# Patient Record
Sex: FEMALE | Race: WHITE | NOT HISPANIC OR LATINO
[De-identification: names, ages, dates, MRNs, and addresses within clinical notes are randomized per-mention and may not be internally consistent; named-entity substitution may affect disease eponyms.]

---

## 2017-09-14 VITALS
BODY MASS INDEX: 50.03 KG/M2 | RESPIRATION RATE: 15 BRPM | DIASTOLIC BLOOD PRESSURE: 75 MMHG | HEART RATE: 78 BPM | HEIGHT: 61 IN | SYSTOLIC BLOOD PRESSURE: 150 MMHG | WEIGHT: 265 LBS

## 2017-11-09 ENCOUNTER — TRANSCRIPTION ENCOUNTER (OUTPATIENT)
Age: 72
End: 2017-11-09

## 2017-11-21 ENCOUNTER — RECORD ABSTRACTING (OUTPATIENT)
Age: 72
End: 2017-11-21

## 2017-12-14 ENCOUNTER — APPOINTMENT (OUTPATIENT)
Dept: ENDOCRINOLOGY | Facility: CLINIC | Age: 72
End: 2017-12-14
Payer: MEDICARE

## 2017-12-14 VITALS
WEIGHT: 269 LBS | SYSTOLIC BLOOD PRESSURE: 142 MMHG | TEMPERATURE: 97.9 F | HEART RATE: 86 BPM | DIASTOLIC BLOOD PRESSURE: 70 MMHG | BODY MASS INDEX: 50.79 KG/M2 | OXYGEN SATURATION: 16 % | RESPIRATION RATE: 16 BRPM | HEIGHT: 61 IN

## 2017-12-14 LAB — GLUCOSE BLDC GLUCOMTR-MCNC: 95

## 2017-12-14 PROCEDURE — 99214 OFFICE O/P EST MOD 30 MIN: CPT | Mod: 25

## 2017-12-14 PROCEDURE — 82962 GLUCOSE BLOOD TEST: CPT

## 2018-01-11 ENCOUNTER — APPOINTMENT (OUTPATIENT)
Dept: INTERNAL MEDICINE | Facility: CLINIC | Age: 73
End: 2018-01-11
Payer: MEDICARE

## 2018-01-11 VITALS
RESPIRATION RATE: 16 BRPM | SYSTOLIC BLOOD PRESSURE: 140 MMHG | HEIGHT: 61 IN | HEART RATE: 79 BPM | BODY MASS INDEX: 50.22 KG/M2 | OXYGEN SATURATION: 94 % | DIASTOLIC BLOOD PRESSURE: 80 MMHG | TEMPERATURE: 97.8 F | WEIGHT: 266 LBS

## 2018-01-11 DIAGNOSIS — Z82.49 FAMILY HISTORY OF ISCHEMIC HEART DISEASE AND OTHER DISEASES OF THE CIRCULATORY SYSTEM: ICD-10-CM

## 2018-01-11 DIAGNOSIS — L57.8 OTHER SKIN CHANGES DUE TO CHRONIC EXPOSURE TO NONIONIZING RADIATION: ICD-10-CM

## 2018-01-11 DIAGNOSIS — Z83.3 FAMILY HISTORY OF DIABETES MELLITUS: ICD-10-CM

## 2018-01-11 DIAGNOSIS — Z82.61 FAMILY HISTORY OF ARTHRITIS: ICD-10-CM

## 2018-01-11 PROCEDURE — 99204 OFFICE O/P NEW MOD 45 MIN: CPT

## 2018-01-11 RX ORDER — LISINOPRIL 20 MG/1
20 TABLET ORAL
Refills: 0 | Status: DISCONTINUED | COMMUNITY
End: 2018-01-11

## 2018-01-16 ENCOUNTER — CHART COPY (OUTPATIENT)
Age: 73
End: 2018-01-16

## 2018-01-16 ENCOUNTER — TRANSCRIPTION ENCOUNTER (OUTPATIENT)
Age: 73
End: 2018-01-16

## 2018-01-17 ENCOUNTER — CLINICAL ADVICE (OUTPATIENT)
Age: 73
End: 2018-01-17

## 2018-01-18 ENCOUNTER — TRANSCRIPTION ENCOUNTER (OUTPATIENT)
Age: 73
End: 2018-01-18

## 2018-01-24 ENCOUNTER — TRANSCRIPTION ENCOUNTER (OUTPATIENT)
Age: 73
End: 2018-01-24

## 2018-01-25 ENCOUNTER — APPOINTMENT (OUTPATIENT)
Dept: DERMATOLOGY | Facility: CLINIC | Age: 73
End: 2018-01-25
Payer: MEDICARE

## 2018-01-25 VITALS
TEMPERATURE: 97.2 F | BODY MASS INDEX: 51.73 KG/M2 | DIASTOLIC BLOOD PRESSURE: 83 MMHG | HEART RATE: 76 BPM | WEIGHT: 274 LBS | OXYGEN SATURATION: 96 % | HEIGHT: 61 IN | SYSTOLIC BLOOD PRESSURE: 151 MMHG

## 2018-01-25 DIAGNOSIS — D22.9 MELANOCYTIC NEVI, UNSPECIFIED: ICD-10-CM

## 2018-01-25 DIAGNOSIS — Z86.79 PERSONAL HISTORY OF OTHER DISEASES OF THE CIRCULATORY SYSTEM: ICD-10-CM

## 2018-01-25 DIAGNOSIS — Z86.39 PERSONAL HISTORY OF OTHER ENDOCRINE, NUTRITIONAL AND METABOLIC DISEASE: ICD-10-CM

## 2018-01-25 DIAGNOSIS — Z84.0 FAMILY HISTORY OF DISEASES OF THE SKIN AND SUBCUTANEOUS TISSUE: ICD-10-CM

## 2018-01-25 DIAGNOSIS — L81.4 OTHER MELANIN HYPERPIGMENTATION: ICD-10-CM

## 2018-01-25 DIAGNOSIS — D18.01 HEMANGIOMA OF SKIN AND SUBCUTANEOUS TISSUE: ICD-10-CM

## 2018-01-25 PROCEDURE — 99203 OFFICE O/P NEW LOW 30 MIN: CPT

## 2018-02-08 ENCOUNTER — APPOINTMENT (OUTPATIENT)
Dept: OBGYN | Facility: CLINIC | Age: 73
End: 2018-02-08
Payer: MEDICARE

## 2018-02-08 VITALS
HEART RATE: 89 BPM | WEIGHT: 274 LBS | RESPIRATION RATE: 16 BRPM | BODY MASS INDEX: 51.73 KG/M2 | OXYGEN SATURATION: 98 % | SYSTOLIC BLOOD PRESSURE: 149 MMHG | DIASTOLIC BLOOD PRESSURE: 84 MMHG | TEMPERATURE: 98.7 F | HEIGHT: 61 IN

## 2018-02-08 DIAGNOSIS — N95.2 POSTMENOPAUSAL ATROPHIC VAGINITIS: ICD-10-CM

## 2018-02-08 DIAGNOSIS — Z78.0 ASYMPTOMATIC MENOPAUSAL STATE: ICD-10-CM

## 2018-02-08 PROCEDURE — 99213 OFFICE O/P EST LOW 20 MIN: CPT | Mod: 25

## 2018-02-08 PROCEDURE — G0101: CPT

## 2018-02-12 LAB — CYTOLOGY CVX/VAG DOC THIN PREP: NORMAL

## 2018-02-15 ENCOUNTER — RESULT REVIEW (OUTPATIENT)
Age: 73
End: 2018-02-15

## 2018-02-23 PROBLEM — Z13.820 OSTEOPOROSIS SCREENING: Status: RESOLVED | Noted: 2018-01-11 | Resolved: 2018-02-23

## 2018-02-26 ENCOUNTER — APPOINTMENT (OUTPATIENT)
Dept: GYNECOLOGIC ONCOLOGY | Facility: CLINIC | Age: 73
End: 2018-02-26
Payer: MEDICARE

## 2018-02-26 VITALS
SYSTOLIC BLOOD PRESSURE: 142 MMHG | WEIGHT: 274 LBS | BODY MASS INDEX: 51.73 KG/M2 | HEART RATE: 86 BPM | TEMPERATURE: 97.6 F | DIASTOLIC BLOOD PRESSURE: 74 MMHG | HEIGHT: 61 IN

## 2018-02-26 DIAGNOSIS — Z13.820 ENCOUNTER FOR SCREENING FOR OSTEOPOROSIS: ICD-10-CM

## 2018-02-26 PROCEDURE — 58100 BIOPSY OF UTERUS LINING: CPT

## 2018-02-26 PROCEDURE — 99214 OFFICE O/P EST MOD 30 MIN: CPT | Mod: 25

## 2018-02-26 PROCEDURE — 99205 OFFICE O/P NEW HI 60 MIN: CPT | Mod: 25

## 2018-02-28 ENCOUNTER — RESULT REVIEW (OUTPATIENT)
Age: 73
End: 2018-02-28

## 2018-03-01 ENCOUNTER — APPOINTMENT (OUTPATIENT)
Dept: ENDOCRINOLOGY | Facility: CLINIC | Age: 73
End: 2018-03-01
Payer: MEDICARE

## 2018-03-01 VITALS
HEIGHT: 61 IN | BODY MASS INDEX: 51.54 KG/M2 | TEMPERATURE: 98.2 F | DIASTOLIC BLOOD PRESSURE: 70 MMHG | SYSTOLIC BLOOD PRESSURE: 164 MMHG | WEIGHT: 273 LBS | OXYGEN SATURATION: 98 % | HEART RATE: 66 BPM | RESPIRATION RATE: 16 BRPM

## 2018-03-01 LAB — CORE LAB BIOPSY: NORMAL

## 2018-03-01 PROCEDURE — 99214 OFFICE O/P EST MOD 30 MIN: CPT

## 2018-04-12 ENCOUNTER — APPOINTMENT (OUTPATIENT)
Dept: INTERNAL MEDICINE | Facility: CLINIC | Age: 73
End: 2018-04-12
Payer: MEDICARE

## 2018-04-12 VITALS
HEIGHT: 61 IN | DIASTOLIC BLOOD PRESSURE: 70 MMHG | BODY MASS INDEX: 49.84 KG/M2 | HEART RATE: 77 BPM | OXYGEN SATURATION: 96 % | SYSTOLIC BLOOD PRESSURE: 142 MMHG | RESPIRATION RATE: 16 BRPM | TEMPERATURE: 97.7 F | WEIGHT: 264 LBS

## 2018-04-12 PROCEDURE — 99214 OFFICE O/P EST MOD 30 MIN: CPT

## 2018-05-09 ENCOUNTER — RESULT REVIEW (OUTPATIENT)
Age: 73
End: 2018-05-09

## 2018-05-14 ENCOUNTER — APPOINTMENT (OUTPATIENT)
Dept: GYNECOLOGIC ONCOLOGY | Facility: CLINIC | Age: 73
End: 2018-05-14
Payer: MEDICARE

## 2018-05-14 VITALS
BODY MASS INDEX: 50.03 KG/M2 | OXYGEN SATURATION: 97 % | TEMPERATURE: 97.6 F | HEART RATE: 80 BPM | SYSTOLIC BLOOD PRESSURE: 140 MMHG | DIASTOLIC BLOOD PRESSURE: 60 MMHG | HEIGHT: 61 IN | WEIGHT: 265 LBS

## 2018-05-14 PROCEDURE — 99214 OFFICE O/P EST MOD 30 MIN: CPT

## 2018-05-25 ENCOUNTER — TRANSCRIPTION ENCOUNTER (OUTPATIENT)
Age: 73
End: 2018-05-25

## 2018-05-29 ENCOUNTER — RX RENEWAL (OUTPATIENT)
Age: 73
End: 2018-05-29

## 2018-05-30 ENCOUNTER — OUTPATIENT (OUTPATIENT)
Dept: OUTPATIENT SERVICES | Facility: HOSPITAL | Age: 73
LOS: 1 days | End: 2018-05-30
Payer: MEDICARE

## 2018-05-30 VITALS
HEIGHT: 60 IN | DIASTOLIC BLOOD PRESSURE: 76 MMHG | TEMPERATURE: 98 F | RESPIRATION RATE: 16 BRPM | SYSTOLIC BLOOD PRESSURE: 171 MMHG | HEART RATE: 76 BPM | WEIGHT: 270.07 LBS | OXYGEN SATURATION: 97 %

## 2018-05-30 DIAGNOSIS — Z98.890 OTHER SPECIFIED POSTPROCEDURAL STATES: Chronic | ICD-10-CM

## 2018-05-30 DIAGNOSIS — R93.8 ABNORMAL FINDINGS ON DIAGNOSTIC IMAGING OF OTHER SPECIFIED BODY STRUCTURES: ICD-10-CM

## 2018-05-30 DIAGNOSIS — Z90.89 ACQUIRED ABSENCE OF OTHER ORGANS: Chronic | ICD-10-CM

## 2018-05-30 DIAGNOSIS — Z01.818 ENCOUNTER FOR OTHER PREPROCEDURAL EXAMINATION: ICD-10-CM

## 2018-05-30 DIAGNOSIS — H26.9 UNSPECIFIED CATARACT: Chronic | ICD-10-CM

## 2018-05-30 LAB
ALBUMIN SERPL ELPH-MCNC: 3.9 G/DL — SIGNIFICANT CHANGE UP (ref 3.5–5)
ALP SERPL-CCNC: 56 U/L — SIGNIFICANT CHANGE UP (ref 40–120)
ALT FLD-CCNC: 23 U/L DA — SIGNIFICANT CHANGE UP (ref 10–60)
ANION GAP SERPL CALC-SCNC: 6 MMOL/L — SIGNIFICANT CHANGE UP (ref 5–17)
APTT BLD: 27.8 SEC — SIGNIFICANT CHANGE UP (ref 27.5–37.4)
AST SERPL-CCNC: 17 U/L — SIGNIFICANT CHANGE UP (ref 10–40)
BILIRUB SERPL-MCNC: 0.5 MG/DL — SIGNIFICANT CHANGE UP (ref 0.2–1.2)
BUN SERPL-MCNC: 22 MG/DL — HIGH (ref 7–18)
CALCIUM SERPL-MCNC: 9.4 MG/DL — SIGNIFICANT CHANGE UP (ref 8.4–10.5)
CHLORIDE SERPL-SCNC: 108 MMOL/L — SIGNIFICANT CHANGE UP (ref 96–108)
CO2 SERPL-SCNC: 29 MMOL/L — SIGNIFICANT CHANGE UP (ref 22–31)
CREAT SERPL-MCNC: 0.75 MG/DL — SIGNIFICANT CHANGE UP (ref 0.5–1.3)
GLUCOSE SERPL-MCNC: 97 MG/DL — SIGNIFICANT CHANGE UP (ref 70–99)
HCT VFR BLD CALC: 45.1 % — HIGH (ref 34.5–45)
HGB BLD-MCNC: 14.4 G/DL — SIGNIFICANT CHANGE UP (ref 11.5–15.5)
INR BLD: 1.08 RATIO — SIGNIFICANT CHANGE UP (ref 0.88–1.16)
MCHC RBC-ENTMCNC: 29.1 PG — SIGNIFICANT CHANGE UP (ref 27–34)
MCHC RBC-ENTMCNC: 32 GM/DL — SIGNIFICANT CHANGE UP (ref 32–36)
MCV RBC AUTO: 90.8 FL — SIGNIFICANT CHANGE UP (ref 80–100)
PLATELET # BLD AUTO: 225 K/UL — SIGNIFICANT CHANGE UP (ref 150–400)
POTASSIUM SERPL-MCNC: 4.1 MMOL/L — SIGNIFICANT CHANGE UP (ref 3.5–5.3)
POTASSIUM SERPL-SCNC: 4.1 MMOL/L — SIGNIFICANT CHANGE UP (ref 3.5–5.3)
PROT SERPL-MCNC: 7.8 G/DL — SIGNIFICANT CHANGE UP (ref 6–8.3)
PROTHROM AB SERPL-ACNC: 11.8 SEC — SIGNIFICANT CHANGE UP (ref 9.8–12.7)
RBC # BLD: 4.96 M/UL — SIGNIFICANT CHANGE UP (ref 3.8–5.2)
RBC # FLD: 12.3 % — SIGNIFICANT CHANGE UP (ref 10.3–14.5)
SODIUM SERPL-SCNC: 143 MMOL/L — SIGNIFICANT CHANGE UP (ref 135–145)
TSH SERPL-MCNC: 0.27 UU/ML — LOW (ref 0.34–4.82)
WBC # BLD: 6.2 K/UL — SIGNIFICANT CHANGE UP (ref 3.8–10.5)
WBC # FLD AUTO: 6.2 K/UL — SIGNIFICANT CHANGE UP (ref 3.8–10.5)

## 2018-05-30 PROCEDURE — G0463: CPT

## 2018-05-30 PROCEDURE — 85610 PROTHROMBIN TIME: CPT

## 2018-05-30 PROCEDURE — 85730 THROMBOPLASTIN TIME PARTIAL: CPT

## 2018-05-30 PROCEDURE — 84443 ASSAY THYROID STIM HORMONE: CPT

## 2018-05-30 PROCEDURE — 80053 COMPREHEN METABOLIC PANEL: CPT

## 2018-05-30 PROCEDURE — 83036 HEMOGLOBIN GLYCOSYLATED A1C: CPT

## 2018-05-30 PROCEDURE — 85027 COMPLETE CBC AUTOMATED: CPT

## 2018-05-30 RX ORDER — SODIUM CHLORIDE 9 MG/ML
3 INJECTION INTRAMUSCULAR; INTRAVENOUS; SUBCUTANEOUS EVERY 8 HOURS
Qty: 0 | Refills: 0 | Status: DISCONTINUED | OUTPATIENT
Start: 2018-06-08 | End: 2018-06-16

## 2018-05-30 NOTE — H&P PST ADULT - PMH
Abnormal finding on diagnostic imaging of other region of abdomen or pelvis    Essential hypertension    Hyperlipidemia, unspecified hyperlipidemia type    Hypothyroidism, unspecified type    Morbid obesity    Other pulmonary embolism without acute cor pulmonale, unspecified chronicity    Postmenopausal bleeding    Seasonal allergic rhinitis, unspecified trigger Essential hypertension    Hyperlipidemia, unspecified hyperlipidemia type    Hypothyroidism, unspecified type    Morbid obesity    Other pulmonary embolism without acute cor pulmonale, unspecified chronicity    Postmenopausal bleeding    Primary osteoarthritis of both knees    Seasonal allergic rhinitis, unspecified trigger

## 2018-05-30 NOTE — H&P PST ADULT - PSH
Cataract of both eyes, unspecified cataract type  excised  H/O dilation and curettage  in 2002 and 2007  History of tonsillectomy Cataract of both eyes, unspecified cataract type  s/p excision of cataracts bilaterally  H/O dilation and curettage  in 2002 and 2007  History of tonsillectomy  childhood

## 2018-05-30 NOTE — H&P PST ADULT - ASSESSMENT
73 y/o female with PMH of chronic postmenopausal bleeding, recent medication induced PE (12/14/2017) off warfarin x 3 weeks, morbid obesity BMI >50, T2DM, hypothyroidism, hypertension, diagnosed with abnormal finding on diagnostic imaging scheduled for examination under anesthesia, dilation and curettage, hysteroscopy 6/8/2018. Patient is at moderate to high risk for planned surgery

## 2018-05-30 NOTE — H&P PST ADULT - NEGATIVE GENERAL SYMPTOMS
no weight loss/no sweating/no polyphagia/no anorexia/no polyuria/no polydipsia/no fatigue/no fever/no chills/no malaise

## 2018-05-30 NOTE — H&P PST ADULT - NSANTHOSAYNRD_GEN_A_CORE
No. LUDY screening performed.  STOP BANG Legend: 0-2 = LOW Risk; 3-4 = INTERMEDIATE Risk; 5-8 = HIGH Risk

## 2018-05-30 NOTE — H&P PST ADULT - GENITOURINARY COMMENTS
preop diagnosis abnormal finding on diagnostic test h/o recurrent postmenopausal bleeding preop diagnosis:  abnormal finding on diagnostic test

## 2018-05-30 NOTE — H&P PST ADULT - NEGATIVE GASTROINTESTINAL SYMPTOMS
no flatulence/no nausea/no abdominal pain/no diarrhea/no change in bowel habits/no vomiting/no constipation

## 2018-05-30 NOTE — H&P PST ADULT - NEGATIVE RESPIRATORY AND THORAX SYMPTOMS
no dyspnea/no wheezing/no cough/no hemoptysis/no pleuritic chest pain no wheezing/no cough/no hemoptysis/no pleuritic chest pain

## 2018-05-30 NOTE — H&P PST ADULT - GASTROINTESTINAL DETAILS
no guarding/nontender/soft/no distention/no masses palpable/bowel sounds normal/no bruit/no rebound tenderness/no rigidity/no organomegaly no rigidity/obese abdomen/no guarding/nontender/no distention/no masses palpable/no rebound tenderness/no bruit/no organomegaly/soft/bowel sounds normal

## 2018-05-30 NOTE — H&P PST ADULT - FAMILY HISTORY
Father  Still living? No  Family history of heart disease, Age at diagnosis: Age Unknown     Mother  Still living? Yes, Estimated age: Age Unknown  Family history of cardiomegaly, Age at diagnosis: Age Unknown     Aunt  Still living? No  Diabetes mellitus, type 2, Age at diagnosis: Age Unknown

## 2018-05-30 NOTE — H&P PST ADULT - HISTORY OF PRESENT ILLNESS
71 y/o female with PMH of chronic postmenopausal bleeding, recent PE (12/14/2017) on warfarin, morbid obesity, T2DM, hypothyroidism, hypertension, presents to [facility] for presurgical evaluation. She had abnormal finding on transvaginal US and is scheduled for examination under anesthesia, dilation and curettage, hysteroscopy 6/8/2018 73 y/o female with PMH of chronic postmenopausal bleeding, recent medication induced PE (12/14/2017) off warfarin x 3 weeks, morbid obesity BMI >50, T2DM, hypothyroidism, hypertension, presents to [facility] for presurgical evaluation. She had abnormal finding on transvaginal US and is scheduled for examination under anesthesia, dilation and curettage, hysteroscopy 6/8/2018

## 2018-05-30 NOTE — H&P PST ADULT - MUSCULOSKELETAL
details… No joint pain, swelling or deformity; no limitation of movement detailed exam due to habitus/decreased ROM

## 2018-05-30 NOTE — H&P PST ADULT - RS GEN PE MLT RESP DETAILS PC
clear to auscultation bilaterally/breath sounds equal/good air movement/no intercostal retractions/no chest wall tenderness/no rhonchi/no subcutaneous emphysema/no wheezes/airway patent/normal/no rales/respirations non-labored no subcutaneous emphysema/respirations non-labored/no rhonchi/no wheezes/breath sounds equal/no intercostal retractions/no rales/airway patent/no chest wall tenderness/clear to auscultation bilaterally/good air movement

## 2018-05-30 NOTE — H&P PST ADULT - VENOUS THROMBOEMBOLISM CURRENT STATUS
swollen legs/major surgery, including arthroscopic and laparoscopic (greater than 1 hr)/varicose veins

## 2018-05-31 ENCOUNTER — APPOINTMENT (OUTPATIENT)
Dept: INTERNAL MEDICINE | Facility: CLINIC | Age: 73
End: 2018-05-31
Payer: MEDICARE

## 2018-05-31 ENCOUNTER — NON-APPOINTMENT (OUTPATIENT)
Age: 73
End: 2018-05-31

## 2018-05-31 VITALS — DIASTOLIC BLOOD PRESSURE: 80 MMHG | SYSTOLIC BLOOD PRESSURE: 140 MMHG

## 2018-05-31 VITALS
TEMPERATURE: 97.6 F | RESPIRATION RATE: 16 BRPM | OXYGEN SATURATION: 99 % | HEIGHT: 61 IN | BODY MASS INDEX: 50.22 KG/M2 | WEIGHT: 266 LBS | HEART RATE: 72 BPM | DIASTOLIC BLOOD PRESSURE: 81 MMHG | SYSTOLIC BLOOD PRESSURE: 144 MMHG

## 2018-05-31 DIAGNOSIS — Z87.42 PERSONAL HISTORY OF OTHER DISEASES OF THE FEMALE GENITAL TRACT: ICD-10-CM

## 2018-05-31 DIAGNOSIS — D25.9 LEIOMYOMA OF UTERUS, UNSPECIFIED: ICD-10-CM

## 2018-05-31 DIAGNOSIS — R93.8 ABNORMAL FINDINGS ON DIAGNOSTIC IMAGING OF OTHER SPECIFIED BODY STRUCTURES: ICD-10-CM

## 2018-05-31 LAB — HBA1C BLD-MCNC: 5.8 % — HIGH (ref 4–5.6)

## 2018-05-31 PROCEDURE — 93000 ELECTROCARDIOGRAM COMPLETE: CPT

## 2018-05-31 PROCEDURE — 99214 OFFICE O/P EST MOD 30 MIN: CPT | Mod: 25

## 2018-05-31 RX ORDER — WARFARIN 7.5 MG/1
7.5 TABLET ORAL DAILY
Qty: 90 | Refills: 2 | Status: DISCONTINUED | COMMUNITY
End: 2018-05-31

## 2018-05-31 RX ORDER — WARFARIN 6 MG/1
6 TABLET ORAL DAILY
Qty: 90 | Refills: 1 | Status: DISCONTINUED | COMMUNITY
End: 2018-05-31

## 2018-05-31 RX ORDER — WARFARIN SODIUM 6 MG/1
6 TABLET ORAL
Refills: 0 | Status: DISCONTINUED | COMMUNITY
End: 2018-05-31

## 2018-06-01 PROBLEM — D25.9 UTERINE LEIOMYOMA: Status: ACTIVE | Noted: 2017-11-21

## 2018-06-01 PROBLEM — Z87.42 HISTORY OF POSTMENOPAUSAL BLEEDING: Noted: 2018-02-08

## 2018-06-01 PROBLEM — R93.8 THICKENED ENDOMETRIUM: Status: ACTIVE | Noted: 2018-05-14

## 2018-06-01 RX ORDER — LEVOTHYROXINE SODIUM 0.11 MG/1
112 TABLET ORAL
Qty: 90 | Refills: 3 | Status: DISCONTINUED | COMMUNITY
End: 2018-06-01

## 2018-06-04 LAB — T4 FREE SERPL-MCNC: 1.7 NG/DL

## 2018-06-04 NOTE — PHYSICAL EXAM
[No Acute Distress] : no acute distress [Well Nourished] : well nourished [Well Developed] : well developed [Well-Appearing] : well-appearing [Normal Voice/Communication] : normal voice/communication [Normal Outer Ear/Nose] : the outer ears and nose were normal in appearance [Supple] : supple [No Lymphadenopathy] : no lymphadenopathy [No Respiratory Distress] : no respiratory distress  [Clear to Auscultation] : lungs were clear to auscultation bilaterally [Normal Rate] : normal rate  [Regular Rhythm] : with a regular rhythm [Normal S1, S2] : normal S1 and S2 [Soft] : abdomen soft [Non Tender] : non-tender [Non-distended] : non-distended [Normal Posterior Cervical Nodes] : no posterior cervical lymphadenopathy [Normal Anterior Cervical Nodes] : no anterior cervical lymphadenopathy [Normal Affect] : the affect was normal [Alert and Oriented x3] : oriented to person, place, and time [Normal Mood] : the mood was normal [de-identified] : morbidly obese [de-identified] : decr bowel sounds 2/2 body habitus [de-identified] : left lumbar paravertebral muscle tender

## 2018-06-04 NOTE — RESULTS/DATA
[] : results reviewed [ECG Reviewed] : reviewed [Normal] : The 12 - lead ECG is normal [No Acute Ischemia] : No acute ischemia [Ventricular Rate___] : ventricular rate is [unfilled] beats per minute [Sinus Bradycardia] : sinus bradycardia [MD Interval___] : [unfilled] seconds [QTc Interval___] : QTc interval: [unfilled]

## 2018-06-04 NOTE — HISTORY OF PRESENT ILLNESS
[Coronary Artery Disease] : coronary artery disease [Diabetes] : diabetes  [Hypertension] : ~T hypertension [DVT/PE] : deep vein thrombosis/pulmonary embolism [( Patient denies any chest pain, claudication, dyspnea on exertion, orthopnea, palpitations or syncope )] : Patient denies any chest pain, claudication, dyspnea on exertion, orthopnea, palpitations or syncope. [Moderate (4-6 METs)] : Moderate (4-6 METs) [Sleep Apnea] : no sleep apnea [COPD] : no COPD [Previous Adverse Anesthesia Reaction] : no previous adverse anesthesia reaction [FreeTextEntry1] : D&C [FreeTextEntry2] : 6/8/18 [FreeTextEntry3] : Dr. Murray MD [FreeTextEntry4] : \par EMMANUEL VEGA is a 72 year old woman with h/o HTN, HLD, T2DM, Hypothyroidism, Bilateral PE, Morbid obesity presents for evaluation prior to D&C for further evaluation of thickened endometrial lining.  Her 5/2018 US revealed 1.5 cm endometrial echo.  She reports two prior D&Cs, experienced heavy bleeding post op after one of the D&Cs several years ago while on Coumadin.  She discontinued Coumadin a week ago after taking for 3 years for provoked PE presumed to be due to prolonged Provera use.  \par \par Today feels well - denies pelvic pain, vaginal bleeding, chest pain, SOB, dizziness, weakness, palpitations, lightheadedness or syncope.  Remaining ROS as noted below.  [FreeTextEntry7] : Stress Test, TTE 2016 reportedly normal\par

## 2018-06-04 NOTE — ASSESSMENT
[High Risk Surgery - Intraperitoneal, Intrathoracic or Supringuinal Vascular Procedures] : High Risk Surgery - Intraperitoneal, Intrathoracic or Supringuinal Vascular Procedures - No (0) [Ischemic Heart Disease] : Ischemic Heart Disease - No (0) [Congestive Heart Failure] : Congestive Heart Failure - No (0) [Prior Cerebrovascular Accident or TIA] : Prior Cerebrovascular Accident or TIA - No (0) [Creatinine >= 2mg/dL (1 Point)] : Creatinine >= 2mg/dL - No (0) [Insulin-dependent Diabetic (1 Point)] : Insulin-dependent Diabetic - No (0) [As per surgery] : as per surgery [Other: _____] : [unfilled] [Patient Optimized for Surgery] : Patient optimized for surgery [Modify medications prior to procedure] : Modify medications prior to procedure [FreeTextEntry4] : 72 year old woman with h/o HTN, HLD, T2DM, Hypothyroidism, Bilateral PE, Morbid obesity presents for evaluation prior to D&C for thickened endometrium. Low TSH in presurgical testing labs.  Additional testing ordered, FT4 = 1.7.  Based on her thyroid function tests, patient has been advised to skip Levothyroxine dose for one day, then resume Levothyroxine at lowered dose of 100 mcg daily, titrating down from her current dose Levothyroxine 112 mcg daily.  Rx for Levothyroxine 100 mcg daily sent to patient's pharmacy.  Discussed management of her Levothyroxine dosage with patient's Endocrinologist Dr. Adelso MD who is in agreement with the plan of care.  \par \par Patient is optimized for surgery at this time.  \par \par  [FreeTextEntry7] : Hold AM dose of Metformin day of surgery

## 2018-06-05 ENCOUNTER — TRANSCRIPTION ENCOUNTER (OUTPATIENT)
Age: 73
End: 2018-06-05

## 2018-06-05 ENCOUNTER — RX RENEWAL (OUTPATIENT)
Age: 73
End: 2018-06-05

## 2018-06-07 ENCOUNTER — TRANSCRIPTION ENCOUNTER (OUTPATIENT)
Age: 73
End: 2018-06-07

## 2018-06-08 ENCOUNTER — RESULT REVIEW (OUTPATIENT)
Age: 73
End: 2018-06-08

## 2018-06-08 ENCOUNTER — OUTPATIENT (OUTPATIENT)
Dept: OUTPATIENT SERVICES | Facility: HOSPITAL | Age: 73
LOS: 1 days | End: 2018-06-08
Payer: MEDICARE

## 2018-06-08 ENCOUNTER — APPOINTMENT (OUTPATIENT)
Age: 73
End: 2018-06-08

## 2018-06-08 VITALS
RESPIRATION RATE: 16 BRPM | WEIGHT: 270.07 LBS | TEMPERATURE: 97 F | HEIGHT: 60 IN | DIASTOLIC BLOOD PRESSURE: 47 MMHG | HEART RATE: 70 BPM | OXYGEN SATURATION: 98 % | SYSTOLIC BLOOD PRESSURE: 146 MMHG

## 2018-06-08 VITALS
SYSTOLIC BLOOD PRESSURE: 125 MMHG | TEMPERATURE: 97 F | RESPIRATION RATE: 16 BRPM | HEART RATE: 60 BPM | DIASTOLIC BLOOD PRESSURE: 47 MMHG | OXYGEN SATURATION: 99 %

## 2018-06-08 DIAGNOSIS — R93.8 ABNORMAL FINDINGS ON DIAGNOSTIC IMAGING OF OTHER SPECIFIED BODY STRUCTURES: ICD-10-CM

## 2018-06-08 DIAGNOSIS — Z90.89 ACQUIRED ABSENCE OF OTHER ORGANS: Chronic | ICD-10-CM

## 2018-06-08 DIAGNOSIS — H26.9 UNSPECIFIED CATARACT: Chronic | ICD-10-CM

## 2018-06-08 DIAGNOSIS — Z98.890 OTHER SPECIFIED POSTPROCEDURAL STATES: Chronic | ICD-10-CM

## 2018-06-08 PROCEDURE — 58558 HYSTEROSCOPY BIOPSY: CPT

## 2018-06-08 PROCEDURE — 88305 TISSUE EXAM BY PATHOLOGIST: CPT

## 2018-06-08 PROCEDURE — 88305 TISSUE EXAM BY PATHOLOGIST: CPT | Mod: 26

## 2018-06-08 PROCEDURE — 82962 GLUCOSE BLOOD TEST: CPT

## 2018-06-08 RX ORDER — LEVOTHYROXINE SODIUM 125 MCG
1 TABLET ORAL
Qty: 0 | Refills: 0 | COMMUNITY

## 2018-06-08 RX ORDER — GLUCOSAMINE/CHONDROITIN/C/MANG 500-400 MG
1 CAPSULE ORAL
Qty: 0 | Refills: 0 | COMMUNITY

## 2018-06-08 RX ORDER — HEPARIN SODIUM 5000 [USP'U]/ML
5000 INJECTION INTRAVENOUS; SUBCUTANEOUS ONCE
Qty: 0 | Refills: 0 | Status: COMPLETED | OUTPATIENT
Start: 2018-06-08 | End: 2018-06-08

## 2018-06-08 RX ORDER — CHOLECALCIFEROL (VITAMIN D3) 125 MCG
1 CAPSULE ORAL
Qty: 0 | Refills: 0 | COMMUNITY

## 2018-06-08 RX ORDER — VITAMIN E 100 UNIT
1 CAPSULE ORAL
Qty: 0 | Refills: 0 | COMMUNITY

## 2018-06-08 RX ORDER — IBUPROFEN 200 MG
600 TABLET ORAL EVERY 6 HOURS
Qty: 0 | Refills: 0 | Status: DISCONTINUED | OUTPATIENT
Start: 2018-06-08 | End: 2018-06-16

## 2018-06-08 RX ORDER — WARFARIN SODIUM 2.5 MG/1
1 TABLET ORAL
Qty: 0 | Refills: 0 | COMMUNITY

## 2018-06-08 RX ORDER — LISINOPRIL 2.5 MG/1
1 TABLET ORAL
Qty: 0 | Refills: 0 | COMMUNITY

## 2018-06-08 RX ORDER — ACETAMINOPHEN 500 MG
1000 TABLET ORAL ONCE
Qty: 0 | Refills: 0 | Status: COMPLETED | OUTPATIENT
Start: 2018-06-08 | End: 2018-06-08

## 2018-06-08 RX ORDER — IBUPROFEN 200 MG
1 TABLET ORAL
Qty: 0 | Refills: 0 | COMMUNITY

## 2018-06-08 RX ORDER — FENTANYL CITRATE 50 UG/ML
25 INJECTION INTRAVENOUS
Qty: 0 | Refills: 0 | Status: DISCONTINUED | OUTPATIENT
Start: 2018-06-08 | End: 2018-06-08

## 2018-06-08 RX ORDER — METFORMIN HYDROCHLORIDE 850 MG/1
0.5 TABLET ORAL
Qty: 0 | Refills: 0 | COMMUNITY

## 2018-06-08 RX ORDER — AMLODIPINE BESYLATE 2.5 MG/1
1 TABLET ORAL
Qty: 0 | Refills: 0 | COMMUNITY

## 2018-06-08 RX ADMIN — Medication 1000 MILLIGRAM(S): at 11:26

## 2018-06-08 RX ADMIN — HEPARIN SODIUM 5000 UNIT(S): 5000 INJECTION INTRAVENOUS; SUBCUTANEOUS at 08:25

## 2018-06-08 RX ADMIN — SODIUM CHLORIDE 3 MILLILITER(S): 9 INJECTION INTRAMUSCULAR; INTRAVENOUS; SUBCUTANEOUS at 08:25

## 2018-06-08 RX ADMIN — Medication 400 MILLIGRAM(S): at 10:55

## 2018-06-08 NOTE — BRIEF OPERATIVE NOTE - OPERATION/FINDINGS
Multiple endometrial masses, likely endometrial polyps, involving the fundus, right lateral wall and posterior wall.  Bilateral ostia visualized.  Normal appearing cervix.

## 2018-06-08 NOTE — ASU DISCHARGE PLAN (ADULT/PEDIATRIC). - ITEMS TO FOLLOWUP WITH YOUR PHYSICIAN'S
Follow up in office as scheduled in 2 weeks - Please call Dr. Gao's office to schedule your 2 week postoperative appointment.    - Pelvic rest.  Nothing in the vagina until you are cleared by your doctor.  No tampons, no douching, no baths, no intercourse.

## 2018-06-08 NOTE — ASU PATIENT PROFILE, ADULT - PMH
Diabetes mellitus    Essential hypertension    Hyperlipidemia, unspecified hyperlipidemia type    Hypothyroidism, unspecified type    Morbid obesity    Other pulmonary embolism without acute cor pulmonale, unspecified chronicity    Postmenopausal bleeding    Primary osteoarthritis of both knees    Seasonal allergic rhinitis, unspecified trigger

## 2018-06-08 NOTE — ASU DISCHARGE PLAN (ADULT/PEDIATRIC). - YOU WERE IN THE HOSPITAL FOR:
Scheduled examination under anesthesia and D&C Scheduled examination under anesthesia and diagnostic hysteroscopy with dilation and curettage

## 2018-06-08 NOTE — BRIEF OPERATIVE NOTE - PROCEDURE
<<-----Click on this checkbox to enter Procedure Polypectomy, uterine  06/08/2018    Active  NADINE  Diagnostic hysteroscopy with dilation and curettage of uterus  06/08/2018    Active  NADINE

## 2018-06-08 NOTE — BRIEF OPERATIVE NOTE - ANTIBIOTIC PROTOCOL
CC:  Denise Dewey is here today for Nail Problem (Nail fungus)  .    Complaint- New patient c/o thick discolored nails . She has tried OTC remedies with no relief. She has not had a nail sample taken to test for fungus.    Date of Onset - one year ago    Primary Care Physician: Pennie Carver MD    Patient last seen by Primary Care Physician? 10/23/17    Patient has seen other physicians for reported complaint?No  Patient relates history related to complaint?  No    Question: OTC Kerasil and Fungi-NailPatient reports they have tried the following treatment and reports improvement:No      Patient has not had imaging. na  Patient has not had lab.     Allergies: ALLERGIES:  No Active Allergies    Medications:   Current Outpatient Prescriptions   Medication Sig Dispense Refill   • simvastatin (ZOCOR) 20 MG tablet TAKE 1 TABLET BY MOUTH EVERY NIGHT 90 tablet 2   • LORazepam (ATIVAN) 1 MG tablet Take one tablet by mouth daily as needed for anxiety 30 tablet 0   • magnesium 250 MG tablet      • Undecylenic Acid (FUNGI-NAIL) 25 % Solution      • Salicylic Acid-Urea (KERASAL EX)      • Melatonin 1 MG Cap        No current facility-administered medications for this visit.      Refills: No    Vitals:   Vitals:    11/21/17 1259   BP: 124/80   Pulse: 80   Weight: 68.5 kg   Height: 5' 5\" (1.651 m)       Review of Systems:  Eye Problem(s):negative  Musculoskeletal problem(s):negative  Integumentary problem(s):nail changes  Neurological problem(s):negative  Endocrine problem(s):negative  Hematologic and/or Lymphatic problem(s):negative    Tobacco history: verified  Patient's current [facility] status: Active.  Health Maintenance Due   Topic Date Due   • Depression Screening  03/02/1969     Patient is up to date, no discussion needed .               Followed protocol

## 2018-06-08 NOTE — ASU DISCHARGE PLAN (ADULT/PEDIATRIC). - NOTIFY
GYN Fever>100.4/Inability to Tolerate Liquids or Foods/Pain not relieved by Medications/Increased Irritability or Sluggishness/Excessive Diarrhea/Swelling that continues/Bleeding that does not stop/Unable to Urinate/Persistent Nausea and Vomiting

## 2018-06-08 NOTE — ASU PATIENT PROFILE, ADULT - PSH
Cataract of both eyes, unspecified cataract type  s/p excision of cataracts bilaterally  H/O dilation and curettage  in 2002 and 2007  History of tonsillectomy  childhood

## 2018-06-08 NOTE — ASU PATIENT PROFILE, ADULT - VISION (WITH CORRECTIVE LENSES IF THE PATIENT USUALLY WEARS THEM):
glasses for everything/Partially impaired: cannot see medication labels or newsprint, but can see obstacles in path, and the surrounding layout; can count fingers at arm's length

## 2018-06-08 NOTE — ASU DISCHARGE PLAN (ADULT/PEDIATRIC). - ACTIVITY LEVEL
no heavy lifting/no douching/no intercourse/nothing per rectum/no weight bearing/nothing per vagina/no tub baths/no sports/gym/no exercise/no tampons

## 2018-06-08 NOTE — ASU DISCHARGE PLAN (ADULT/PEDIATRIC). - MEDICATION SUMMARY - MEDICATIONS TO TAKE
I will START or STAY ON the medications listed below when I get home from the hospital:    ibuprofen 400 mg oral tablet  -- 1 tab(s) by mouth every 6 hours, As Needed  -- Indication: For As needed for pain

## 2018-06-25 ENCOUNTER — APPOINTMENT (OUTPATIENT)
Dept: GYNECOLOGIC ONCOLOGY | Facility: CLINIC | Age: 73
End: 2018-06-25
Payer: MEDICARE

## 2018-06-25 VITALS
OXYGEN SATURATION: 95 % | HEIGHT: 61 IN | BODY MASS INDEX: 49.84 KG/M2 | TEMPERATURE: 98.2 F | WEIGHT: 264 LBS | SYSTOLIC BLOOD PRESSURE: 124 MMHG | DIASTOLIC BLOOD PRESSURE: 72 MMHG | HEART RATE: 63 BPM

## 2018-06-25 PROCEDURE — 99213 OFFICE O/P EST LOW 20 MIN: CPT

## 2018-07-05 ENCOUNTER — APPOINTMENT (OUTPATIENT)
Dept: ENDOCRINOLOGY | Facility: CLINIC | Age: 73
End: 2018-07-05
Payer: MEDICARE

## 2018-07-05 VITALS
BODY MASS INDEX: 50.79 KG/M2 | TEMPERATURE: 98.2 F | SYSTOLIC BLOOD PRESSURE: 127 MMHG | HEART RATE: 74 BPM | WEIGHT: 269 LBS | HEIGHT: 61 IN | OXYGEN SATURATION: 96 % | DIASTOLIC BLOOD PRESSURE: 74 MMHG

## 2018-07-05 LAB — GLUCOSE BLDC GLUCOMTR-MCNC: 114

## 2018-07-05 PROCEDURE — 82962 GLUCOSE BLOOD TEST: CPT

## 2018-07-05 PROCEDURE — 99214 OFFICE O/P EST MOD 30 MIN: CPT | Mod: 25

## 2018-07-12 ENCOUNTER — APPOINTMENT (OUTPATIENT)
Dept: INTERNAL MEDICINE | Facility: CLINIC | Age: 73
End: 2018-07-12
Payer: MEDICARE

## 2018-07-12 VITALS
RESPIRATION RATE: 17 BRPM | TEMPERATURE: 99 F | OXYGEN SATURATION: 99 % | HEART RATE: 74 BPM | SYSTOLIC BLOOD PRESSURE: 128 MMHG | WEIGHT: 272 LBS | BODY MASS INDEX: 51.35 KG/M2 | HEIGHT: 61 IN | DIASTOLIC BLOOD PRESSURE: 70 MMHG

## 2018-07-12 PROCEDURE — 99214 OFFICE O/P EST MOD 30 MIN: CPT

## 2018-07-12 RX ORDER — CIPROFLOXACIN HYDROCHLORIDE 500 MG/1
500 TABLET, FILM COATED ORAL
Qty: 30 | Refills: 0 | Status: DISCONTINUED | COMMUNITY
Start: 2018-05-08 | End: 2018-07-12

## 2018-07-13 NOTE — ASSESSMENT
[FreeTextEntry1] : PLAN\par 72 year old woman with history of  HTN, HLD, DMII, Hypothyroidism, bilateral PE, morbid obesity stable on the current regimen, diet and lifestyle modifications as counseled. Continue conservative management s/p fall, pain control.  F/u with Otolaryngology for closed reduction of nasal fracture.  Labs as ordered (added to lab slip provided by Dr. Darden).  The plan as ordered.\par

## 2018-07-13 NOTE — PHYSICAL EXAM
[No Acute Distress] : no acute distress [Well Nourished] : well nourished [Well Developed] : well developed [Well-Appearing] : well-appearing [Normal Voice/Communication] : normal voice/communication [Normal Outer Ear/Nose] : the outer ears and nose were normal in appearance [Supple] : supple [No Lymphadenopathy] : no lymphadenopathy [No Respiratory Distress] : no respiratory distress  [Clear to Auscultation] : lungs were clear to auscultation bilaterally [Normal Rate] : normal rate  [Regular Rhythm] : with a regular rhythm [Normal S1, S2] : normal S1 and S2 [Soft] : abdomen soft [Non Tender] : non-tender [Non-distended] : non-distended [Normal Posterior Cervical Nodes] : no posterior cervical lymphadenopathy [Normal Anterior Cervical Nodes] : no anterior cervical lymphadenopathy [Normal Gait] : normal gait [No Focal Deficits] : no focal deficits [Normal Affect] : the affect was normal [Alert and Oriented x3] : oriented to person, place, and time [Normal Mood] : the mood was normal [de-identified] : morbidly obese [de-identified] : decr bowel sounds 2/2 body habitus [de-identified] : mild swelling left forearm [de-identified] : Laceration on forehead intact, no discharge, no swelling, no surrounding erythema, diffuse ecchymosis face, left forearm

## 2018-07-13 NOTE — HISTORY OF PRESENT ILLNESS
[FreeTextEntry1] : Cassidy Adams is a 72 year old female who presents for follow-up Morbid Obesity, HTN, T2DM, Hypothyroidism, Vitamin D deficiency [de-identified] : Patient tripped over her foot last Fri in the hallway of her building landed on her head.  Brought by EMS to [facility], sustained a nasal fracture.  CT head negative, XR left knee, left wrist no fractures.  She has plans for closed reduction nasal fracture and septoplasty with Dr. Geraldine Mccracken MD.  Since her fail, reports her pain has improved, denies HA, dizziness, blurry vision.  \par \par She reports compliance with their medications.  Denies CP, SOB, dizziness, weakness, palpitations.  Remaining ROS as stated.

## 2018-07-13 NOTE — REVIEW OF SYSTEMS
[Joint Pain] : joint pain [Joint Stiffness] : joint stiffness [Muscle Pain] : muscle pain [Negative] : Heme/Lymph [FreeTextEntry9] : s/p fall

## 2018-07-30 PROBLEM — N95.2 POSTMENOPAUSAL ATROPHIC VAGINITIS: Status: ACTIVE | Noted: 2018-02-08

## 2018-11-08 ENCOUNTER — APPOINTMENT (OUTPATIENT)
Dept: ENDOCRINOLOGY | Facility: CLINIC | Age: 73
End: 2018-11-08
Payer: MEDICARE

## 2018-11-08 ENCOUNTER — APPOINTMENT (OUTPATIENT)
Dept: INTERNAL MEDICINE | Facility: CLINIC | Age: 73
End: 2018-11-08
Payer: MEDICARE

## 2018-11-08 VITALS
TEMPERATURE: 98.4 F | RESPIRATION RATE: 17 BRPM | DIASTOLIC BLOOD PRESSURE: 81 MMHG | SYSTOLIC BLOOD PRESSURE: 166 MMHG | WEIGHT: 268 LBS | OXYGEN SATURATION: 96 % | HEIGHT: 61 IN | BODY MASS INDEX: 50.6 KG/M2 | HEART RATE: 74 BPM

## 2018-11-08 DIAGNOSIS — S02.92XA UNSPECIFIED FRACTURE OF FACIAL BONES, INITIAL ENCOUNTER FOR CLOSED FRACTURE: ICD-10-CM
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
## 2018-11-08 PROBLEM — E03.9 HYPOTHYROIDISM, UNSPECIFIED: Chronic | Status: ACTIVE | Noted: 2018-05-30

## 2018-11-08 PROBLEM — E66.01 MORBID (SEVERE) OBESITY DUE TO EXCESS CALORIES: Chronic | Status: ACTIVE | Noted: 2018-05-30

## 2018-11-08 PROBLEM — M17.0 BILATERAL PRIMARY OSTEOARTHRITIS OF KNEE: Chronic | Status: ACTIVE | Noted: 2018-05-30

## 2018-11-08 PROBLEM — N95.0 POSTMENOPAUSAL BLEEDING: Chronic | Status: ACTIVE | Noted: 2018-05-30

## 2018-11-08 PROBLEM — I10 ESSENTIAL (PRIMARY) HYPERTENSION: Chronic | Status: ACTIVE | Noted: 2018-05-30

## 2018-11-08 PROBLEM — I26.99 OTHER PULMONARY EMBOLISM WITHOUT ACUTE COR PULMONALE: Chronic | Status: ACTIVE | Noted: 2018-05-30

## 2018-11-08 PROBLEM — J30.2 OTHER SEASONAL ALLERGIC RHINITIS: Chronic | Status: ACTIVE | Noted: 2018-05-30

## 2018-11-08 PROBLEM — E11.9 TYPE 2 DIABETES MELLITUS WITHOUT COMPLICATIONS: Chronic | Status: ACTIVE | Noted: 2018-06-08

## 2018-11-08 PROBLEM — E78.5 HYPERLIPIDEMIA, UNSPECIFIED: Chronic | Status: ACTIVE | Noted: 2018-05-30

## 2018-11-08 LAB — GLUCOSE BLDC GLUCOMTR-MCNC: 90

## 2018-11-08 PROCEDURE — 90670 PCV13 VACCINE IM: CPT

## 2018-11-08 PROCEDURE — G0439: CPT

## 2018-11-08 PROCEDURE — 99214 OFFICE O/P EST MOD 30 MIN: CPT | Mod: 25

## 2018-11-08 PROCEDURE — 82962 GLUCOSE BLOOD TEST: CPT

## 2018-11-08 PROCEDURE — G0009: CPT

## 2018-11-08 NOTE — HEALTH RISK ASSESSMENT
[Good] : ~his/her~  mood as  good [Any fall with injury in past year] : Patient reported fall with injury in the past year [0] : 2) Feeling down, depressed, or hopeless: Not at all (0) [Patient declined PAP Smear] : Patient declined PAP Smear [Patient declined colonoscopy] : Patient declined colonoscopy [HIV Test offered] : HIV Test offered [Hepatitis C test offered] : Hepatitis C test offered [Fully functional (bathing, dressing, toileting, transferring, walking, feeding)] : Fully functional (bathing, dressing, toileting, transferring, walking, feeding) [Fully functional (using the telephone, shopping, preparing meals, housekeeping, doing laundry, using] : Fully functional and needs no help or supervision to perform IADLs (using the telephone, shopping, preparing meals, housekeeping, doing laundry, using transportation, managing medications and managing finances) [Discussed at today's visit] : Advance Directives Discussed at today's visit [] : No [[id number]] : 0

## 2018-11-08 NOTE — HISTORY OF PRESENT ILLNESS
[Weight Loss ___ Pounds] : Weight loss of [unfilled] pounds [Needs reinforcement, provided] : Patient needs reinforcement on understanding of disease, goals and obesity follow-up plan; reinforcement was provided [FreeTextEntry1] : Annual wellness Exam [de-identified] : 72F with h/o Morbid Obesity, HTN, T2DM, Hypothyroidism, Vitamin D deficiency presents for routine exam. Last visit 7/2018, sustained facial fracture from a mechanical fall in her building. Her wounds healed well, did not need facial surgery. Still with some discoloration on her left forearm.  \par \par For the past several months, she c/o 3-4 episodes of heart racing and elevated BP. Denies associated CP, SOB, denies feeling anxious/stressed at the time. Denies food-related. Denies HA, blurry vision, felt like her head "is about to come off". Lasted 5 min. Stress test several years ago normal. Would experience one episode per month.   \par \par She reports compliance with taking her medications. Feels well currently - denies chest pain, SOB, dizziness, weakness, palpitations, lightheadedness or syncope.  Had recent labs drawn at [facility].  \par \par Labs Reviewed: (10/30/18)\par Hgb\par Cr 0.78 \par A1C = 5,7\par Lipid Panel:   HDL 54    --> 10- year ASCVD risk: 25%\par TSH = 2.80

## 2018-11-08 NOTE — COUNSELING
[Healthy eating counseling provided] : healthy eating [Activity counseling provided] : activity [Decrease Portions] : Decrease food portions [Low Salt Diet] : Low salt diet [Walking] : Walking [Good understanding] : Patient has a good understanding of lifestyle changes and the steps needed to achieve self management goals

## 2018-11-08 NOTE — ASSESSMENT
[FreeTextEntry1] : 73 year-old woman with history of Morbid Obesity, HTN, T2DM, Hypothyroidism, Vitamin D deficiency stable on the current regimen, diet and lifestyle modifications as counseled. Offered nutritional counseling which she declines.  Discussed her 10-year ASCVD risk > 25%, declines starting statin therapy.  Prior results of the blood tests reviewed and discussed with the patient during today's examination. The plan as ordered.

## 2018-11-08 NOTE — PHYSICAL EXAM
[No Acute Distress] : no acute distress [Well Nourished] : well nourished [Well Developed] : well developed [Well-Appearing] : well-appearing [Normal Voice/Communication] : normal voice/communication [Normal Sclera/Conjunctiva] : normal sclera/conjunctiva [Normal Outer Ear/Nose] : the outer ears and nose were normal in appearance [Supple] : supple [No Lymphadenopathy] : no lymphadenopathy [No Respiratory Distress] : no respiratory distress  [Clear to Auscultation] : lungs were clear to auscultation bilaterally [Normal Rate] : normal rate  [Regular Rhythm] : with a regular rhythm [Normal S1, S2] : normal S1 and S2 [No Edema] : there was no peripheral edema [Soft] : abdomen soft [Non Tender] : non-tender [Non-distended] : non-distended [Normal Posterior Cervical Nodes] : no posterior cervical lymphadenopathy [Normal Anterior Cervical Nodes] : no anterior cervical lymphadenopathy [Normal Gait] : normal gait [No Focal Deficits] : no focal deficits [Normal Affect] : the affect was normal [Alert and Oriented x3] : oriented to person, place, and time [Normal Mood] : the mood was normal [de-identified] : morbidly obese [de-identified] : decr bowel sounds 2/2 body habitus [de-identified] : medial left forearm with discoloration  [de-identified] : well healed laceration on forehead intact, no discharge, no swelling, no surrounding erythema, diffuse ecchymosis face, left forearm

## 2018-11-27 ENCOUNTER — APPOINTMENT (OUTPATIENT)
Dept: CARDIOLOGY | Facility: CLINIC | Age: 73
End: 2018-11-27
Payer: MEDICARE

## 2018-11-27 VITALS
SYSTOLIC BLOOD PRESSURE: 156 MMHG | HEIGHT: 61 IN | TEMPERATURE: 97.9 F | OXYGEN SATURATION: 98 % | DIASTOLIC BLOOD PRESSURE: 74 MMHG | HEART RATE: 82 BPM

## 2018-11-27 PROCEDURE — 93000 ELECTROCARDIOGRAM COMPLETE: CPT

## 2018-11-27 PROCEDURE — 99204 OFFICE O/P NEW MOD 45 MIN: CPT

## 2018-11-27 NOTE — PHYSICAL EXAM
[General Appearance - Well Developed] : well developed [Normal Appearance] : normal appearance [General Appearance - Well Nourished] : well nourished [General Appearance - In No Acute Distress] : no acute distress [Heart Rate And Rhythm] : heart rate and rhythm were normal [Heart Sounds] : normal S1 and S2 [Murmurs] : no murmurs present [Arterial Pulses Normal] : the arterial pulses were normal [Respiration, Rhythm And Depth] : normal respiratory rhythm and effort [Auscultation Breath Sounds / Voice Sounds] : lungs were clear to auscultation bilaterally [Chest Palpation] : palpation of the chest revealed no abnormalities [Lungs Percussion] : the lungs were normal to percussion [Bowel Sounds] : normal bowel sounds [Abdomen Soft] : soft [Abdomen Tenderness] : non-tender [Abdomen Mass (___ Cm)] : no abdominal mass palpated [Nail Clubbing] : no clubbing of the fingernails [Cyanosis, Localized] : no localized cyanosis [] : no ischemic changes [Oriented To Time, Place, And Person] : oriented to person, place, and time [Impaired Insight] : insight and judgment were intact [Affect] : the affect was normal [FreeTextEntry1] : +1 pitting edema of LE b/l; mild varicose veins noted on LE, more prominent on RLE

## 2018-11-27 NOTE — END OF VISIT
[] : Resident [FreeTextEntry3] : \par 72 yo F with HTN, HLD, and T2DM presenting with few episodes of minimally symptomatic palpitations\par -Will check echocardiogram for structural heart disease\par -Even if it is SVT, patient state symptoms not severe enough to warrant therapy with beta blockade\par -If symptoms worsen, will perform additional evaluation including Holter/event monitor depending on symptom frequency\par -BP suboptimally controlled, patient did not tolerate amlodipine 10mg due to LE edema. If noted LVH on echo, will consider increasing lisinopril. \par \par Will call patient with results at [phone], OK to leave voicemail

## 2018-11-27 NOTE — ASSESSMENT
[FreeTextEntry1] : Ms. Cassidy Adams is a 73 year old female presenting to cardiology clinic for evaluation of palpitations that occurred three months prior to this visit. She reports that all three episodes of palpitations self-resolved and lasted no more than 5 minutes. She denies these symptoms having any impact on her functional status and denies any recurrent episodes. She currently reports feeling well and has no other complaints.\par \par Given patient's account of her symptoms and her past medical history, it is reasonable to pursue an EKG and echocardiogram at this time to rule out any cardiac structural abnormalities that may have resulted from an underlying undetected cardiac arrhythmia.

## 2018-11-27 NOTE — HISTORY OF PRESENT ILLNESS
[FreeTextEntry1] : Ms. Cassidy Adams is a 73 year old female with active medical history of HTN, DM and hypothyroidism presenting to cardiology clinic with chief complaint of palpitations. She reports experiencing three separate episodes of palpitations around 3 months ago. She says the longest episode lasted no more than 5 minutes, and that all episodes self-resolved with rest. She also reports an associated symptom of what she describes as pressure in her head, which also spontaneously resolved. She denies any recurrent episodes, LOC, syncope, dizziness, nausea, vomiting, chest pain or any other associated symptoms. At present, she reports feeling well. She says that her past episodes of palpitations have not adversely affected her life in any way.

## 2018-11-27 NOTE — REVIEW OF SYSTEMS
[Fever] : no fever [Headache] : no headache [Chills] : no chills [Feeling Fatigued] : not feeling fatigued [Shortness Of Breath] : no shortness of breath [Dyspnea on exertion] : not dyspnea during exertion [Chest  Pressure] : no chest pressure [Chest Pain] : no chest pain [Leg Claudication] : no intermittent leg claudication [Cough] : no cough [Wheezing] : no wheezing [Abdominal Pain] : no abdominal pain [Nausea] : no nausea [Vomiting] : no vomiting [Heartburn] : no heartburn [Change in Appetite] : no change in appetite [Dysphagia] : no dysphagia [Joint Pain] : no joint pain [Confusion] : no confusion was observed [Anxiety] : no anxiety [Excessive Thirst] : no polydipsia [Easy Bleeding] : no tendency for easy bleeding [Easy Bruising] : no tendency for easy bruising

## 2018-12-04 ENCOUNTER — OUTPATIENT (OUTPATIENT)
Dept: OUTPATIENT SERVICES | Facility: HOSPITAL | Age: 73
LOS: 1 days | End: 2018-12-04
Payer: MEDICARE

## 2018-12-04 DIAGNOSIS — H26.9 UNSPECIFIED CATARACT: Chronic | ICD-10-CM

## 2018-12-04 DIAGNOSIS — Z90.89 ACQUIRED ABSENCE OF OTHER ORGANS: Chronic | ICD-10-CM

## 2018-12-04 DIAGNOSIS — R00.2 PALPITATIONS: ICD-10-CM

## 2018-12-04 DIAGNOSIS — Z98.890 OTHER SPECIFIED POSTPROCEDURAL STATES: Chronic | ICD-10-CM

## 2018-12-04 PROCEDURE — 93306 TTE W/DOPPLER COMPLETE: CPT

## 2018-12-04 PROCEDURE — 93306 TTE W/DOPPLER COMPLETE: CPT | Mod: 26

## 2018-12-09 NOTE — H&P PST ADULT - VENOUS THROMBOEMBOLISM SCORE
Patient Instructions by Shawnee Brady MD at 02/28/17 02:00 PM     Author:  Shawnee Brady MD Service:  (none) Author Type:  Physician     Filed:  02/28/17 02:01 PM Encounter Date:  2/28/2017 Status:  Signed     :  Shawnee Brady MD (Physician)            Increase metformin 1000mg twice daily    Limit sweets and starches  Regular exercise    Monitor blood sugar    Schedule mammogram     Do monthly breast exam    PATIENT INFORMATION   .    Follow-Up  -- Follow up with your regular Primary Care Provider.     Additional Educational Resources:  For additional resources regarding your symptoms, diagnosis, or further health information, please visit the Health Resources section on [email or web address] or the Online Health Resources section in [employer].            Revision History        User Key Date/Time User Provider Type Action    > [N/A] 02/28/17 02:01 PM Shawnee Brady MD Physician Sign             12

## 2018-12-10 ENCOUNTER — TRANSCRIPTION ENCOUNTER (OUTPATIENT)
Age: 73
End: 2018-12-10

## 2018-12-10 ENCOUNTER — RX RENEWAL (OUTPATIENT)
Age: 73
End: 2018-12-10

## 2018-12-21 ENCOUNTER — TRANSCRIPTION ENCOUNTER (OUTPATIENT)
Age: 73
End: 2018-12-21

## 2018-12-23 ENCOUNTER — RX RENEWAL (OUTPATIENT)
Age: 73
End: 2018-12-23

## 2018-12-23 ENCOUNTER — TRANSCRIPTION ENCOUNTER (OUTPATIENT)
Age: 73
End: 2018-12-23

## 2019-01-09 ENCOUNTER — TRANSCRIPTION ENCOUNTER (OUTPATIENT)
Age: 74
End: 2019-01-09

## 2019-01-09 ENCOUNTER — RX RENEWAL (OUTPATIENT)
Age: 74
End: 2019-01-09

## 2019-01-10 ENCOUNTER — RX RENEWAL (OUTPATIENT)
Age: 74
End: 2019-01-10

## 2019-01-22 ENCOUNTER — MEDICATION RENEWAL (OUTPATIENT)
Age: 74
End: 2019-01-22

## 2019-01-22 ENCOUNTER — TRANSCRIPTION ENCOUNTER (OUTPATIENT)
Age: 74
End: 2019-01-22

## 2019-02-27 LAB — HEMOCCULT STL QL IA: NEGATIVE

## 2019-03-01 ENCOUNTER — TRANSCRIPTION ENCOUNTER (OUTPATIENT)
Age: 74
End: 2019-03-01

## 2019-03-07 ENCOUNTER — APPOINTMENT (OUTPATIENT)
Dept: ENDOCRINOLOGY | Facility: CLINIC | Age: 74
End: 2019-03-07
Payer: MEDICARE

## 2019-03-07 ENCOUNTER — APPOINTMENT (OUTPATIENT)
Dept: INTERNAL MEDICINE | Facility: CLINIC | Age: 74
End: 2019-03-07
Payer: MEDICARE

## 2019-03-07 VITALS
HEART RATE: 80 BPM | TEMPERATURE: 97.7 F | HEIGHT: 61 IN | RESPIRATION RATE: 16 BRPM | SYSTOLIC BLOOD PRESSURE: 135 MMHG | BODY MASS INDEX: 50.98 KG/M2 | OXYGEN SATURATION: 98 % | DIASTOLIC BLOOD PRESSURE: 73 MMHG | WEIGHT: 270 LBS

## 2019-03-07 VITALS
WEIGHT: 270 LBS | RESPIRATION RATE: 16 BRPM | HEIGHT: 61 IN | DIASTOLIC BLOOD PRESSURE: 80 MMHG | HEART RATE: 77 BPM | TEMPERATURE: 97.6 F | SYSTOLIC BLOOD PRESSURE: 145 MMHG | BODY MASS INDEX: 50.98 KG/M2 | OXYGEN SATURATION: 98 %

## 2019-03-07 DIAGNOSIS — Z86.39 PERSONAL HISTORY OF OTHER ENDOCRINE, NUTRITIONAL AND METABOLIC DISEASE: ICD-10-CM

## 2019-03-07 DIAGNOSIS — M54.42 LUMBAGO WITH SCIATICA, LEFT SIDE: ICD-10-CM

## 2019-03-07 DIAGNOSIS — Z23 ENCOUNTER FOR IMMUNIZATION: ICD-10-CM

## 2019-03-07 DIAGNOSIS — R00.2 PALPITATIONS: ICD-10-CM

## 2019-03-07 DIAGNOSIS — E55.9 VITAMIN D DEFICIENCY, UNSPECIFIED: ICD-10-CM

## 2019-03-07 PROCEDURE — 99214 OFFICE O/P EST MOD 30 MIN: CPT

## 2019-03-07 PROCEDURE — 82962 GLUCOSE BLOOD TEST: CPT

## 2019-03-07 PROCEDURE — 99214 OFFICE O/P EST MOD 30 MIN: CPT | Mod: 25

## 2019-03-07 NOTE — HISTORY OF PRESENT ILLNESS
[Weight Gain ___ Pounds] : Weight gain of [unfilled] pounds [Needs reinforcement, provided] : Patient needs reinforcement on understanding of disease, goals and obesity follow-up plan; reinforcement was provided [de-identified] : 73F with h/o Morbid Obesity, HTN, T2DM, Hypothyroidism, Vitamin D deficiency presents for medical follow-up.  No new/acute complaints today.  She denies chest pain, SOB, dizziness, weakness, palpitations, lightheadedness or syncope.  She reports compliance with taking her medications. \par \par Since last visit saw Cardiology 11/27/18 for the intermittent palpitations which self-resolved.  No further episodes since then.  \par TTE (12/4/18) - Normal diastolic function, hyperdynamic LV systolic function LVEF > 70%\par \par She continues to experience chronic post-nasal drip.  Coughing mostly in the mornings, not the he evenings or when she sleeps.  She c/o preset thermostat is high in her apartment which makes her uncomfortable.   \par \par Gained 2 lbs since last visit.  Admits to enjoying cheese and cookies.  Saw Dr. Darden earlier today.   A1C is 5.8  <-- 5.7.  Declines nutritionist counseling.

## 2019-03-07 NOTE — ASSESSMENT
[FreeTextEntry1] : 73 year-old woman with history of  Morbid Obesity, HTN, T2DM, Hypothyroidism, Vitamin D deficiency stable on the current regimen, diet and lifestyle modifications as counseled. Prior results of the blood tests reviewed and discussed with the patient during today's examination. Mammogram due, encouraged to schedule.  Conservative therapy as counseled for relief of nasal congestion.  Intermittent palpitations resolved, continue to moitior.    The plan as ordered.

## 2019-03-07 NOTE — ASSESSMENT
[FreeTextEntry1] : Patients diabetes is well controlled\par Still unable to lose weight\par Advised to follow the diet and try to exercise.\par Refuses medication for hyperlipidemia\par Will continue the same treatment\par Will repeat the MRI of the abdomen for the adrenal adenoma

## 2019-03-07 NOTE — DATA REVIEWED
[FreeTextEntry1] : The FBS and HbA1c are well controlled. The lipid panel still abnormal. Patient refuses to take any medications.

## 2019-03-07 NOTE — HISTORY OF PRESENT ILLNESS
[FreeTextEntry1] : Patient feels fine, asymptomatic. The blood glucose at home are below 120 mg/dl. The FBS in the laboratory was 96 mg/dl and the HbA1c was 5.8  %. The renal function is fine. She is not exercising and following the diet. Her weight has increases slightly. Rarely his blood glucose drops at night. The patient denies polydipsia, polyuria, increased appetite, blurred vision, chest pain, leg edema. She denies/has numbness, tingling sensation, burning sensation on extremities. Her renal function is fine, she has no retinopathy, no neuropathy. The hypertension is well controlled, the hyperlipidemia is well controlled. Taking his medications regularly. She has seen the Ophthalmologist recently, she has seen Podiatrist recently. She has seen the Cardiologist recently. No side effects of the medications.\par

## 2019-03-07 NOTE — PHYSICAL EXAM
[Alert] : alert [No Acute Distress] : no acute distress [Normal Sclera/Conjunctiva] : normal sclera/conjunctiva [PERRL] : pupils equal, round and reactive to light [Normal Outer Ear/Nose] : the ears and nose were normal in appearance [Normal Hearing] : hearing was normal [No Neck Mass] : no neck mass was observed [Supple] : the neck was supple [No Respiratory Distress] : no respiratory distress [Normal Rate and Effort] : normal respiratory rhythm and effort [Normal Reflexes] : deep tendon reflexes were 2+ and symmetric [No Motor Deficits] : the motor exam was normal

## 2019-03-07 NOTE — PHYSICAL EXAM
[No Acute Distress] : no acute distress [Well Nourished] : well nourished [Well Developed] : well developed [Well-Appearing] : well-appearing [Normal Voice/Communication] : normal voice/communication [Normal Sclera/Conjunctiva] : normal sclera/conjunctiva [Normal Outer Ear/Nose] : the outer ears and nose were normal in appearance [Supple] : supple [No Lymphadenopathy] : no lymphadenopathy [No Respiratory Distress] : no respiratory distress  [Clear to Auscultation] : lungs were clear to auscultation bilaterally [Normal Rate] : normal rate  [Regular Rhythm] : with a regular rhythm [Normal S1, S2] : normal S1 and S2 [No Edema] : there was no peripheral edema [Soft] : abdomen soft [Non Tender] : non-tender [Non-distended] : non-distended [Normal Posterior Cervical Nodes] : no posterior cervical lymphadenopathy [Normal Anterior Cervical Nodes] : no anterior cervical lymphadenopathy [Normal Gait] : normal gait [No Focal Deficits] : no focal deficits [Normal Affect] : the affect was normal [Alert and Oriented x3] : oriented to person, place, and time [Normal Mood] : the mood was normal [de-identified] : morbidly obese [de-identified] : decr bowel sounds 2/2 body habitus

## 2019-03-08 LAB — GLUCOSE BLDC GLUCOMTR-MCNC: 97

## 2019-03-22 DIAGNOSIS — M89.9 DISORDER OF BONE, UNSPECIFIED: ICD-10-CM

## 2019-03-25 ENCOUNTER — TRANSCRIPTION ENCOUNTER (OUTPATIENT)
Age: 74
End: 2019-03-25

## 2019-03-26 ENCOUNTER — TRANSCRIPTION ENCOUNTER (OUTPATIENT)
Age: 74
End: 2019-03-26

## 2019-04-30 ENCOUNTER — APPOINTMENT (OUTPATIENT)
Dept: SURGICAL ONCOLOGY | Facility: CLINIC | Age: 74
End: 2019-04-30
Payer: MEDICARE

## 2019-04-30 VITALS
HEART RATE: 80 BPM | SYSTOLIC BLOOD PRESSURE: 136 MMHG | TEMPERATURE: 97.6 F | HEIGHT: 61 IN | DIASTOLIC BLOOD PRESSURE: 71 MMHG | BODY MASS INDEX: 50.6 KG/M2 | WEIGHT: 268 LBS

## 2019-04-30 PROCEDURE — 99205 OFFICE O/P NEW HI 60 MIN: CPT

## 2019-04-30 NOTE — CONSULT LETTER
[Dear  ___] : Dear  [unfilled], [Consult Letter:] : I had the pleasure of evaluating your patient, [unfilled]. [Please see my note below.] : Please see my note below. [Consult Closing:] : Thank you very much for allowing me to participate in the care of this patient.  If you have any questions, please do not hesitate to contact me. [Sincerely,] : Sincerely, [FreeTextEntry1] : I will keep you informed of the pathology of the biopsy. [FreeTextEntry3] : Jean Phillips MD FACS\par Chief of Surgical Oncology\par \par

## 2019-04-30 NOTE — ASSESSMENT
[FreeTextEntry1] : IMP:\par 4.8 cm soft tissue mass left gluteal region\par \par PLAN:\par CT guided biopsy of mass

## 2019-04-30 NOTE — HISTORY OF PRESENT ILLNESS
[de-identified] : 73 year-old female presents for an initial consultation.  In March 2019 Dr. Jemal Darden referred her for an abdominal MRI for surveillance  of an adrenal adenoma.  Study demonstrated a stable 1.1 cm left adrenal adenoma, benign-appearing liver lesion and an incidental 4.5 cm mass in the left gluteal region.  As such, she was referred for an MRI of the left hip which revealed a 4.8 cm soft tissue lesion with central necrosis interposed between the left gluteus medius and minimus muscles.\par \par Her past medical history includes HTN, DM 2, hypothyroidism, PE in 2014 (felt secondary to prolonged medroxyprogesterone use for dysfunctional uterine bleeding) she was on Coumadin for 3 years but was advised to stop per her hematologist. \par \par She believes she may be able to palpate the mass but she is unsure.  She suffers from sciatica and chronic back pain but denies any pain, or LLE weakness or paraesthesias that is out of the ordinary for her. \par \par PCP: Dr. Samia Santamaria\par Referring MD: Dr. Anna Cardoza

## 2019-04-30 NOTE — PHYSICAL EXAM
[Normal] : supple, no neck mass and thyroid not enlarged [Normal Supraclavicular Lymph Nodes] : normal supraclavicular lymph nodes [Normal Groin Lymph Nodes] : normal groin lymph nodes [Normal] : oriented to person, place and time, with appropriate affect [de-identified] : mass not palpable in the left buttock

## 2019-05-06 ENCOUNTER — APPOINTMENT (OUTPATIENT)
Dept: INTERVENTIONAL RADIOLOGY/VASCULAR | Facility: HOSPITAL | Age: 74
End: 2019-05-06
Payer: MEDICARE

## 2019-05-06 PROCEDURE — 99203 OFFICE O/P NEW LOW 30 MIN: CPT

## 2019-05-06 NOTE — PHYSICAL EXAM
[Alert] : alert [No Acute Distress] : no acute distress [Normal Hearing] : hearing was normal [No Respiratory Distress] : no respiratory distress [Oriented x3] : oriented to person, place, and time

## 2019-05-06 NOTE — HISTORY OF PRESENT ILLNESS
[Stable] : stable [FreeTextEntry1] : 73 year old female with PMH of HTN, DM2, hypothyroidism, PE in 2014 s/p Coumadin therapy, who presents for initial consultation for left gluteal mass biopsy. Patient had a recent MRI for surveillance of left adrenal adenoma, at which point a left gluteal mass was incidentally discovered. A dedicated MRI of left hip was performed, which revealed a 4.8 cm left gluteal region mass. Patient states that she is unable to palpate the mass. Patient reports no symptoms other than intermittent chronic back and sciatic type pain. Currently, patient denies feeling pain. Patient denies any trauma to the area; denies redness, swelling, erythema. Denies recent weight loss, fevers, chills, nausea/vomiting/diarrhea; no CP/SOB.\par  [de-identified] : see report

## 2019-05-06 NOTE — ASSESSMENT
[FreeTextEntry1] : 72 yo F who presents for consultation for biopsy of an incidentally discovered left gluteal mass. Left hip MRI was reviewed, which revealed a 4.8 cm heterogeneous mass between the left gluteus medius and minimus muscles. The procedure (CT guided percutaneous needle biopsy of left gluteal mass) was discussed with the patient in detail. The risks, benefits, and alternatives were also discussed, including but not limited to risks of bleeding, infection and nerve damage. It was also discussed that this procedure will likely be done under local anesthesia. All questions were answered. Patient demonstrates understanding and wishes to proceed with the biopsy.

## 2019-05-06 NOTE — REVIEW OF SYSTEMS
[Negative] : Neurological [Chest Pain] : no chest pain [Shortness Of Breath] : no shortness of breath [Abdominal Pain] : no abdominal pain [Vomiting] : no vomiting [Diarrhea] : no diarrhea

## 2019-05-06 NOTE — CONSULT LETTER
[Dear  ___] : Dear  [unfilled], [Consult Letter:] : I had the pleasure of evaluating your patient, [unfilled]. [( Thank you for referring [unfilled] for consultation for _____ )] : Thank you for referring [unfilled] for consultation for [unfilled] [Consult Closing:] : Thank you very much for allowing me to participate in the care of this patient.  If you have any questions, please do not hesitate to contact me.

## 2019-06-04 ENCOUNTER — OTHER (OUTPATIENT)
Age: 74
End: 2019-06-04

## 2019-06-17 ENCOUNTER — OUTPATIENT (OUTPATIENT)
Dept: OUTPATIENT SERVICES | Facility: HOSPITAL | Age: 74
LOS: 1 days | End: 2019-06-17
Payer: MEDICARE

## 2019-06-17 ENCOUNTER — APPOINTMENT (OUTPATIENT)
Dept: INTERVENTIONAL RADIOLOGY/VASCULAR | Facility: HOSPITAL | Age: 74
End: 2019-06-17
Payer: MEDICARE

## 2019-06-17 ENCOUNTER — RESULT REVIEW (OUTPATIENT)
Age: 74
End: 2019-06-17

## 2019-06-17 DIAGNOSIS — H26.9 UNSPECIFIED CATARACT: Chronic | ICD-10-CM

## 2019-06-17 DIAGNOSIS — R93.89 ABNORMAL FINDINGS ON DIAGNOSTIC IMAGING OF OTHER SPECIFIED BODY STRUCTURES: ICD-10-CM

## 2019-06-17 DIAGNOSIS — Z98.890 OTHER SPECIFIED POSTPROCEDURAL STATES: Chronic | ICD-10-CM

## 2019-06-17 DIAGNOSIS — Z90.89 ACQUIRED ABSENCE OF OTHER ORGANS: Chronic | ICD-10-CM

## 2019-06-17 PROCEDURE — 88305 TISSUE EXAM BY PATHOLOGIST: CPT

## 2019-06-17 PROCEDURE — 77012 CT SCAN FOR NEEDLE BIOPSY: CPT

## 2019-06-17 PROCEDURE — 20206 BIOPSY MUSCLE PERQ NEEDLE: CPT

## 2019-06-17 PROCEDURE — 88305 TISSUE EXAM BY PATHOLOGIST: CPT | Mod: 26

## 2019-06-17 PROCEDURE — 77012 CT SCAN FOR NEEDLE BIOPSY: CPT | Mod: 26

## 2019-06-17 NOTE — PROGRESS NOTE ADULT - SUBJECTIVE AND OBJECTIVE BOX
Interventional Radiology Brief- Operative Note    Procedure: CT-guided biopsy of left gluteal mass	    Operators: JAMILA Heart MD    Anesthesia (type): local    Contrast: none    EBL:  <1 ml    Findings/Follow up Plan of Care: Core biopsy x3 of left gluteal mass performed, with specimens handed to and confirmed by the pathologist in attendance. Follow up results.    Specimens Removed: as above    Implants: none    Complications: none    Condition/Disposition: stable/recovery, then home if patient remains hemodynamically stable    Please call Interventional Radiology with any questions, concerns, or issues.      Official report to follow.

## 2019-06-17 NOTE — PROGRESS NOTE ADULT - SUBJECTIVE AND OBJECTIVE BOX
Interventional Radiology Pre-Procedure Note    Procedure: Left gluteal mass biopsy    Diagnosis/Indication: Patient is a 73y old  Female with left gluteal mass, referred for biopsy.     PAST MEDICAL & SURGICAL HISTORY:  Diabetes mellitus  Primary osteoarthritis of both knees  Other pulmonary embolism without acute cor pulmonale, unspecified chronicity  Seasonal allergic rhinitis, unspecified trigger  Hypothyroidism, unspecified type  Postmenopausal bleeding  Hyperlipidemia, unspecified hyperlipidemia type  Morbid obesity  Essential hypertension  H/O dilation and curettage: in 2002 and 2007  Cataract of both eyes, unspecified cataract type: s/p excision of cataracts bilaterally  History of tonsillectomy: childhood     Allergies: sulfa drugs (Short breath; Anaphylaxis)    LABS: Recent labs from 6/10/19 reviewed    Procedure/ risks/ benefits/ alternatives were discussed with the patient in prior consultation as well as right before the procedure. The patient verbalizes understanding, and witnessed informed consent was obtained.

## 2019-06-20 ENCOUNTER — APPOINTMENT (OUTPATIENT)
Dept: INTERVENTIONAL RADIOLOGY/VASCULAR | Facility: CLINIC | Age: 74
End: 2019-06-20

## 2019-07-09 ENCOUNTER — APPOINTMENT (OUTPATIENT)
Dept: SURGICAL ONCOLOGY | Facility: CLINIC | Age: 74
End: 2019-07-09
Payer: MEDICARE

## 2019-07-09 VITALS
DIASTOLIC BLOOD PRESSURE: 81 MMHG | OXYGEN SATURATION: 97 % | SYSTOLIC BLOOD PRESSURE: 140 MMHG | HEART RATE: 82 BPM | TEMPERATURE: 98.4 F | WEIGHT: 270 LBS | HEIGHT: 61 IN | BODY MASS INDEX: 50.98 KG/M2

## 2019-07-09 PROCEDURE — 99214 OFFICE O/P EST MOD 30 MIN: CPT

## 2019-07-09 NOTE — CONSULT LETTER
[Dear  ___] : Dear  [unfilled], [Courtesy Letter:] : I had the pleasure of seeing your patient, [unfilled], in my office today. [Consult Closing:] : Thank you very much for allowing me to participate in the care of this patient.  If you have any questions, please do not hesitate to contact me. [Please see my note below.] : Please see my note below. [Sincerely,] : Sincerely, [FreeTextEntry1] : I will keep you informed of the results of the follow-up CT scan. [FreeTextEntry3] : Jean Phillips MD FACS\par Chief of Surgical Oncology\par \par

## 2019-07-09 NOTE — ASSESSMENT
[FreeTextEntry1] : IMP: left gluteal spindle cell lipoma\par \par Plan:\par surgical excision discussed\par Pt. has decided on follow-up with an MRI in 6 months

## 2019-07-09 NOTE — HISTORY OF PRESENT ILLNESS
[de-identified] : 73 year-old female presented in April 2019   for an abdominal MRI for surveillance of an adrenal adenoma. Study demonstrated a stable 1.1 cm left adrenal adenoma, benign-appearing liver lesion and an incidental 4.5 cm mass in the left gluteal region. As such, she was referred for an MRI of the left hip which revealed a 4.8 cm soft tissue lesion with central necrosis interposed between the left gluteus medius and minimus muscles.  Patient reported no symptoms other than intermittent chronic back and sciatic type pain.Patient is s/p CT guided percutaneous needle biopsy of left gluteal mass on 05/06/2019 by IR. 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 \par \par Her past medical history includes HTN, DM 2, hypothyroidism, PE in 2014 (felt secondary to prolonged medroxyprogesterone use for dysfunctional uterine bleeding) she was on Coumadin for 3 years but was advised to stop per her hematologist. \par

## 2019-07-09 NOTE — PHYSICAL EXAM
[Normal] : supple, no neck mass and thyroid not enlarged [Normal Neck Lymph Nodes] : normal neck lymph nodes  [Normal Supraclavicular Lymph Nodes] : normal supraclavicular lymph nodes [Normal Groin Lymph Nodes] : normal groin lymph nodes [Normal Axillary Lymph Nodes] : normal axillary lymph nodes [Normal] : oriented to person, place and time, with appropriate affect [de-identified] : left gluteal mass not palpable

## 2019-07-11 ENCOUNTER — APPOINTMENT (OUTPATIENT)
Dept: INTERNAL MEDICINE | Facility: CLINIC | Age: 74
End: 2019-07-11
Payer: MEDICARE

## 2019-07-11 ENCOUNTER — APPOINTMENT (OUTPATIENT)
Dept: ENDOCRINOLOGY | Facility: CLINIC | Age: 74
End: 2019-07-11
Payer: MEDICARE

## 2019-07-11 VITALS
SYSTOLIC BLOOD PRESSURE: 135 MMHG | HEART RATE: 81 BPM | WEIGHT: 266 LBS | BODY MASS INDEX: 50.22 KG/M2 | OXYGEN SATURATION: 97 % | RESPIRATION RATE: 16 BRPM | HEIGHT: 61 IN | TEMPERATURE: 97.6 F | DIASTOLIC BLOOD PRESSURE: 68 MMHG

## 2019-07-11 DIAGNOSIS — M85.852 OTHER SPECIFIED DISORDERS OF BONE DENSITY AND STRUCTURE, LEFT THIGH: ICD-10-CM

## 2019-07-11 DIAGNOSIS — Z00.00 ENCOUNTER FOR GENERAL ADULT MEDICAL EXAMINATION W/OUT ABNORMAL FINDINGS: ICD-10-CM

## 2019-07-11 LAB — GLUCOSE BLDC GLUCOMTR-MCNC: 102

## 2019-07-11 PROCEDURE — 99214 OFFICE O/P EST MOD 30 MIN: CPT | Mod: 25

## 2019-07-11 PROCEDURE — 99204 OFFICE O/P NEW MOD 45 MIN: CPT | Mod: 25

## 2019-07-11 PROCEDURE — 90471 IMMUNIZATION ADMIN: CPT | Mod: GY

## 2019-07-11 PROCEDURE — 90715 TDAP VACCINE 7 YRS/> IM: CPT | Mod: GY

## 2019-07-11 PROCEDURE — 82962 GLUCOSE BLOOD TEST: CPT

## 2019-07-11 RX ORDER — CHROMIUM 200 MCG
TABLET ORAL
Refills: 0 | Status: ACTIVE | COMMUNITY

## 2019-07-11 RX ORDER — PNV NO.95/FERROUS FUM/FOLIC AC 28MG-0.8MG
TABLET ORAL
Refills: 0 | Status: ACTIVE | COMMUNITY

## 2019-07-11 RX ORDER — UBIQUINOL 100 MG
CAPSULE ORAL
Refills: 0 | Status: ACTIVE | COMMUNITY

## 2019-07-11 NOTE — HISTORY OF PRESENT ILLNESS
[FreeTextEntry1] : Patient is doing well, lost a little weight, the diabetes is well controlled, the SBGM is good. The thyroid tests were normal. The US thyroid done last year was fine. The LDL-C is improved.

## 2019-07-11 NOTE — ASSESSMENT
[FreeTextEntry1] : Patient is clinically euthyroid\par The diabetes is well controlled\par Will continue same treatment\par Advised to see the Nutritionist

## 2019-07-11 NOTE — DATA REVIEWED
[FreeTextEntry1] : The TSH and Free T4 are normal. The FBS and HbA1c indicates good diabetic control.

## 2019-07-11 NOTE — PHYSICAL EXAM
[Alert] : alert [Normal Hearing] : hearing was normal [No Neck Mass] : no neck mass was observed [Normal Rate and Effort] : normal respiratory rhythm and effort [Normal Reflexes] : deep tendon reflexes were 2+ and symmetric [No Motor Deficits] : the motor exam was normal [Well Nourished] : well nourished [No Acute Distress] : no acute distress [Well-Appearing] : well-appearing [Well Developed] : well developed [PERRL] : pupils equal round and reactive to light [Normal Sclera/Conjunctiva] : normal sclera/conjunctiva [EOMI] : extraocular movements intact [Normal Outer Ear/Nose] : the outer ears and nose were normal in appearance [No JVD] : no jugular venous distention [Normal Oropharynx] : the oropharynx was normal [Supple] : supple [No Lymphadenopathy] : no lymphadenopathy [Thyroid Normal, No Nodules] : the thyroid was normal and there were no nodules present [No Respiratory Distress] : no respiratory distress  [No Accessory Muscle Use] : no accessory muscle use [Normal Rate] : normal rate  [Clear to Auscultation] : lungs were clear to auscultation bilaterally [Normal S1, S2] : normal S1 and S2 [Regular Rhythm] : with a regular rhythm [No Carotid Bruits] : no carotid bruits [No Murmur] : no murmur heard [No Abdominal Bruit] : a ~M bruit was not heard ~T in the abdomen [Pedal Pulses Present] : the pedal pulses are present [No Palpable Aorta] : no palpable aorta [No Edema] : there was no peripheral edema [No Extremity Clubbing/Cyanosis] : no extremity clubbing/cyanosis [Soft] : abdomen soft [Non Tender] : non-tender [Non-distended] : non-distended [No Masses] : no abdominal mass palpated [No HSM] : no HSM [Normal Bowel Sounds] : normal bowel sounds [Normal Posterior Cervical Nodes] : no posterior cervical lymphadenopathy [Normal Anterior Cervical Nodes] : no anterior cervical lymphadenopathy [No CVA Tenderness] : no CVA  tenderness [No Spinal Tenderness] : no spinal tenderness [No Joint Swelling] : no joint swelling [No Rash] : no rash [Grossly Normal Strength/Tone] : grossly normal strength/tone [Coordination Grossly Intact] : coordination grossly intact [No Focal Deficits] : no focal deficits [Normal Gait] : normal gait [Normal Affect] : the affect was normal [Normal Insight/Judgement] : insight and judgment were intact

## 2019-07-11 NOTE — PHYSICAL EXAM
[Alert] : alert [Normal Hearing] : hearing was normal [No Neck Mass] : no neck mass was observed [Normal Rate and Effort] : normal respiratory rhythm and effort [Normal Reflexes] : deep tendon reflexes were 2+ and symmetric [No Motor Deficits] : the motor exam was normal [Well Nourished] : well nourished [No Acute Distress] : no acute distress [Well Developed] : well developed [Well-Appearing] : well-appearing [Normal Sclera/Conjunctiva] : normal sclera/conjunctiva [PERRL] : pupils equal round and reactive to light [EOMI] : extraocular movements intact [Normal Outer Ear/Nose] : the outer ears and nose were normal in appearance [No JVD] : no jugular venous distention [Normal Oropharynx] : the oropharynx was normal [No Lymphadenopathy] : no lymphadenopathy [Supple] : supple [No Respiratory Distress] : no respiratory distress  [Thyroid Normal, No Nodules] : the thyroid was normal and there were no nodules present [No Accessory Muscle Use] : no accessory muscle use [Clear to Auscultation] : lungs were clear to auscultation bilaterally [Normal Rate] : normal rate  [Regular Rhythm] : with a regular rhythm [Normal S1, S2] : normal S1 and S2 [No Carotid Bruits] : no carotid bruits [No Murmur] : no murmur heard [No Abdominal Bruit] : a ~M bruit was not heard ~T in the abdomen [Pedal Pulses Present] : the pedal pulses are present [No Palpable Aorta] : no palpable aorta [No Edema] : there was no peripheral edema [No Extremity Clubbing/Cyanosis] : no extremity clubbing/cyanosis [Soft] : abdomen soft [Non-distended] : non-distended [Non Tender] : non-tender [No Masses] : no abdominal mass palpated [Normal Bowel Sounds] : normal bowel sounds [No HSM] : no HSM [Normal Posterior Cervical Nodes] : no posterior cervical lymphadenopathy [Normal Anterior Cervical Nodes] : no anterior cervical lymphadenopathy [No CVA Tenderness] : no CVA  tenderness [No Spinal Tenderness] : no spinal tenderness [No Joint Swelling] : no joint swelling [No Rash] : no rash [Grossly Normal Strength/Tone] : grossly normal strength/tone [No Focal Deficits] : no focal deficits [Coordination Grossly Intact] : coordination grossly intact [Normal Gait] : normal gait [Normal Affect] : the affect was normal [Normal Insight/Judgement] : insight and judgment were intact

## 2019-07-11 NOTE — HEALTH RISK ASSESSMENT
[No] : No [0] : 2) Feeling down, depressed, or hopeless: Not at all (0) [Patient reported colonoscopy was normal] : Patient reported colonoscopy was normal [Fully functional (bathing, dressing, toileting, transferring, walking, feeding)] : Fully functional (bathing, dressing, toileting, transferring, walking, feeding) [Fully functional (using the telephone, shopping, preparing meals, housekeeping, doing laundry, using] : Fully functional and needs no help or supervision to perform IADLs (using the telephone, shopping, preparing meals, housekeeping, doing laundry, using transportation, managing medications and managing finances)

## 2019-07-12 PROBLEM — Z00.00 ENCOUNTER FOR PREVENTIVE HEALTH EXAMINATION: Status: ACTIVE | Noted: 2017-11-14

## 2019-07-12 PROBLEM — M85.852 OSTEOPENIA OF NECK OF LEFT FEMUR: Status: ACTIVE | Noted: 2018-04-25

## 2019-07-12 NOTE — HEALTH RISK ASSESSMENT
[] : No [0] : 2) Feeling down, depressed, or hopeless: Not at all (0) [[id number]] : 0 [None] : None [Retired] : retired [Fully functional (bathing, dressing, toileting, transferring, walking, feeding)] : Fully functional (bathing, dressing, toileting, transferring, walking, feeding) [Fully functional (using the telephone, shopping, preparing meals, housekeeping, doing laundry, using] : Fully functional and needs no help or supervision to perform IADLs (using the telephone, shopping, preparing meals, housekeeping, doing laundry, using transportation, managing medications and managing finances) [Reports changes in hearing] : Reports no changes in hearing [Reports changes in vision] : Reports no changes in vision [Reports changes in dental health] : Reports no changes in dental health [MammogramDate] : 03/19 [MammogramComments] : normal [PapSmearDate] : 02/18 [BoneDensityComments] : osteopenia [PapSmearComments] : normal [ColonoscopyDate] : DUE [BoneDensityDate] : 04/18 [ColonoscopyComments] : reported that colonoscopy many yrs ago -> normal

## 2019-07-12 NOTE — PHYSICAL EXAM
[de-identified] : RLE varicosities [No Acute Distress] : no acute distress [Well Developed] : well developed [Well Nourished] : well nourished [Well-Appearing] : well-appearing [Normal Sclera/Conjunctiva] : normal sclera/conjunctiva [PERRL] : pupils equal round and reactive to light [EOMI] : extraocular movements intact [Normal Outer Ear/Nose] : the outer ears and nose were normal in appearance [No JVD] : no jugular venous distention [Normal Oropharynx] : the oropharynx was normal [Thyroid Normal, No Nodules] : the thyroid was normal and there were no nodules present [Supple] : supple [No Lymphadenopathy] : no lymphadenopathy [No Respiratory Distress] : no respiratory distress  [Clear to Auscultation] : lungs were clear to auscultation bilaterally [No Accessory Muscle Use] : no accessory muscle use [Regular Rhythm] : with a regular rhythm [Normal Rate] : normal rate  [No Murmur] : no murmur heard [No Carotid Bruits] : no carotid bruits [Normal S1, S2] : normal S1 and S2 [No Varicosities] : no varicosities [Pedal Pulses Present] : the pedal pulses are present [No Abdominal Bruit] : a ~M bruit was not heard ~T in the abdomen [No Edema] : there was no peripheral edema [No Palpable Aorta] : no palpable aorta [No Extremity Clubbing/Cyanosis] : no extremity clubbing/cyanosis [Non Tender] : non-tender [Soft] : abdomen soft [Non-distended] : non-distended [No Masses] : no abdominal mass palpated [No HSM] : no HSM [Normal Bowel Sounds] : normal bowel sounds [Normal Supraclavicular Nodes] : no supraclavicular lymphadenopathy [Normal Posterior Cervical Nodes] : no posterior cervical lymphadenopathy [Normal Anterior Cervical Nodes] : no anterior cervical lymphadenopathy [No CVA Tenderness] : no CVA  tenderness [No Spinal Tenderness] : no spinal tenderness [No Joint Swelling] : no joint swelling [Grossly Normal Strength/Tone] : grossly normal strength/tone [No Rash] : no rash [No Focal Deficits] : no focal deficits [Coordination Grossly Intact] : coordination grossly intact [Normal Gait] : normal gait [Normal Affect] : the affect was normal [Normal Insight/Judgement] : insight and judgment were intact

## 2019-07-12 NOTE — HISTORY OF PRESENT ILLNESS
[FreeTextEntry1] : establish care [de-identified] : 73yoF PMH DM2, HTN, hypothyroidism, adrenal adenoma (stable), left gluteal lipoma, provoked PE while on progesterone (off coumadin since 5/18), varicose veins presents to establish care.\par \par Patient was recently seen by surgical oncology for incidental finding of left gluteal mass on MRI surveillance for adrenal adenoma.  6/19 needle biopsy -> lipoma with spindle cells.  decision made to repeat MRI in 6 mos.\par \par AM blood sugars 90s.  Patient brings in recent ABIs from podiatry -> normal.\par \par 12/18 TTE -> normal.  Patient states had normal stress test.\par \par endorses right sciatic pain, improved with various supports.  denies chest pain, sob, orthopnea\par received 2 doses shingrix last year\par believes that received Pneumovax last year -> refused Pneumovax today

## 2019-07-12 NOTE — PHYSICAL EXAM
[de-identified] : RLE varicosities [No Acute Distress] : no acute distress [Well Nourished] : well nourished [Well Developed] : well developed [Well-Appearing] : well-appearing [Normal Sclera/Conjunctiva] : normal sclera/conjunctiva [PERRL] : pupils equal round and reactive to light [EOMI] : extraocular movements intact [Normal Outer Ear/Nose] : the outer ears and nose were normal in appearance [No JVD] : no jugular venous distention [Normal Oropharynx] : the oropharynx was normal [Supple] : supple [Thyroid Normal, No Nodules] : the thyroid was normal and there were no nodules present [No Lymphadenopathy] : no lymphadenopathy [No Respiratory Distress] : no respiratory distress  [Clear to Auscultation] : lungs were clear to auscultation bilaterally [No Accessory Muscle Use] : no accessory muscle use [Normal Rate] : normal rate  [Regular Rhythm] : with a regular rhythm [No Murmur] : no murmur heard [Normal S1, S2] : normal S1 and S2 [No Carotid Bruits] : no carotid bruits [Pedal Pulses Present] : the pedal pulses are present [No Varicosities] : no varicosities [No Abdominal Bruit] : a ~M bruit was not heard ~T in the abdomen [No Edema] : there was no peripheral edema [No Palpable Aorta] : no palpable aorta [Soft] : abdomen soft [Non Tender] : non-tender [No Extremity Clubbing/Cyanosis] : no extremity clubbing/cyanosis [Non-distended] : non-distended [No Masses] : no abdominal mass palpated [No HSM] : no HSM [Normal Bowel Sounds] : normal bowel sounds [Normal Supraclavicular Nodes] : no supraclavicular lymphadenopathy [Normal Posterior Cervical Nodes] : no posterior cervical lymphadenopathy [Normal Anterior Cervical Nodes] : no anterior cervical lymphadenopathy [No CVA Tenderness] : no CVA  tenderness [No Spinal Tenderness] : no spinal tenderness [No Joint Swelling] : no joint swelling [No Rash] : no rash [Grossly Normal Strength/Tone] : grossly normal strength/tone [Coordination Grossly Intact] : coordination grossly intact [No Focal Deficits] : no focal deficits [Normal Gait] : normal gait [Normal Affect] : the affect was normal [Normal Insight/Judgement] : insight and judgment were intact

## 2019-07-12 NOTE — HEALTH RISK ASSESSMENT
[] : No [0] : 1) Little interest or pleasure doing things: Not at all (0) [[id number]] : 0 [Retired] : retired [None] : None [Fully functional (bathing, dressing, toileting, transferring, walking, feeding)] : Fully functional (bathing, dressing, toileting, transferring, walking, feeding) [Fully functional (using the telephone, shopping, preparing meals, housekeeping, doing laundry, using] : Fully functional and needs no help or supervision to perform IADLs (using the telephone, shopping, preparing meals, housekeeping, doing laundry, using transportation, managing medications and managing finances) [Reports changes in hearing] : Reports no changes in hearing [Reports changes in vision] : Reports no changes in vision [Reports changes in dental health] : Reports no changes in dental health [MammogramDate] : 03/19 [PapSmearDate] : 02/18 [MammogramComments] : normal [BoneDensityComments] : osteopenia [BoneDensityDate] : 04/18 [PapSmearComments] : normal [ColonoscopyDate] : DUE [ColonoscopyComments] : reported that colonoscopy many yrs ago -> normal

## 2019-07-12 NOTE — HISTORY OF PRESENT ILLNESS
[FreeTextEntry1] : establish care [de-identified] : 73yoF PMH DM2, HTN, hypothyroidism, adrenal adenoma (stable), left gluteal lipoma, provoked PE while on progesterone (off coumadin since 5/18), varicose veins presents to establish care.\par \par Patient was recently seen by surgical oncology for incidental finding of left gluteal mass on MRI surveillance for adrenal adenoma.  6/19 needle biopsy -> lipoma with spindle cells.  decision made to repeat MRI in 6 mos.\par \par AM blood sugars 90s.  Patient brings in recent ABIs from podiatry -> normal.\par \par 12/18 TTE -> normal.  Patient states had normal stress test.\par \par endorses right sciatic pain, improved with various supports.  denies chest pain, sob, orthopnea\par received 2 doses shingrix last year\par believes that received Pneumovax last year -> refused Pneumovax today

## 2019-09-23 ENCOUNTER — TRANSCRIPTION ENCOUNTER (OUTPATIENT)
Age: 74
End: 2019-09-23

## 2019-12-12 ENCOUNTER — TRANSCRIPTION ENCOUNTER (OUTPATIENT)
Age: 74
End: 2019-12-12

## 2019-12-19 ENCOUNTER — APPOINTMENT (OUTPATIENT)
Dept: INTERNAL MEDICINE | Facility: CLINIC | Age: 74
End: 2019-12-19
Payer: MEDICARE

## 2019-12-19 ENCOUNTER — APPOINTMENT (OUTPATIENT)
Dept: ENDOCRINOLOGY | Facility: CLINIC | Age: 74
End: 2019-12-19
Payer: MEDICARE

## 2019-12-19 VITALS
HEART RATE: 84 BPM | SYSTOLIC BLOOD PRESSURE: 171 MMHG | WEIGHT: 260 LBS | RESPIRATION RATE: 16 BRPM | DIASTOLIC BLOOD PRESSURE: 68 MMHG | HEIGHT: 61 IN | OXYGEN SATURATION: 95 % | BODY MASS INDEX: 49.09 KG/M2

## 2019-12-19 DIAGNOSIS — Z12.11 ENCOUNTER FOR SCREENING FOR MALIGNANT NEOPLASM OF COLON: ICD-10-CM

## 2019-12-19 DIAGNOSIS — N84.0 POLYP OF CORPUS UTERI: ICD-10-CM

## 2019-12-19 LAB — GLUCOSE BLDC GLUCOMTR-MCNC: 87

## 2019-12-19 PROCEDURE — 99214 OFFICE O/P EST MOD 30 MIN: CPT

## 2019-12-19 PROCEDURE — 82962 GLUCOSE BLOOD TEST: CPT

## 2019-12-19 PROCEDURE — 99214 OFFICE O/P EST MOD 30 MIN: CPT | Mod: 25

## 2019-12-19 NOTE — HISTORY OF PRESENT ILLNESS
[FreeTextEntry1] : Patient feels well , she is asymptomatic. Her weight has decreased. She is exercising regularly and following the diet. Her blood glucose at home are well controlled, they are usually around 90 mg/dl. The FBS in the laboratory was 95 mg/dl and the HbA1c was 5.8 %, improved/unchanged/increased since last visit. The renal function is within normal limits, the microalbumin in the urine was normal. The lipid panel was within normal limits. She denies low blood glucose during the night. She denies chest pain, or SOB. Denies numbness, tingling or burning sensation on her extremities. Taking her medications regularly. She has seen the Ophthalmologist recently. She has not seen Podiatrist recently. She has seen the Cardiologist last year. She saw Dr. Phillips for the adrenal adenoma. He is repeating the CT scan.\par

## 2019-12-19 NOTE — ASSESSMENT
[FreeTextEntry1] : Patient's diabetes is well controlled\par The LDL-C is slightly elevated\par She refuses medication\par Will continue same treatment\par Advised to follow the diet and exercising\par Patient is clinically euthyroid

## 2019-12-19 NOTE — PHYSICAL EXAM
[Alert] : alert [Normal Sclera/Conjunctiva] : normal sclera/conjunctiva [No Acute Distress] : no acute distress [PERRL] : pupils equal, round and reactive to light [Normal Outer Ear/Nose] : the ears and nose were normal in appearance [Supple] : the neck was supple [No Neck Mass] : no neck mass was observed [Normal Hearing] : hearing was normal [No Respiratory Distress] : no respiratory distress [Normal Rate and Effort] : normal respiratory rhythm and effort [Normal Reflexes] : deep tendon reflexes were 2+ and symmetric [No Motor Deficits] : the motor exam was normal

## 2019-12-20 ENCOUNTER — MED ADMIN CHARGE (OUTPATIENT)
Age: 74
End: 2019-12-20

## 2019-12-20 PROBLEM — N84.0 ENDOMETRIAL POLYP: Status: ACTIVE | Noted: 2018-06-25

## 2019-12-20 PROBLEM — Z12.11 COLON CANCER SCREENING: Status: ACTIVE | Noted: 2018-01-11

## 2019-12-20 NOTE — HISTORY OF PRESENT ILLNESS
[de-identified] : 74yoF PMH DM2, HTN, hypothyroidism, adrenal adenoma (stable), left gluteal lipoma, provoked PE while on progesterone (off coumadin since 5/18), varicose veins presents for follow up\par \par Patient was recently seen by surgical oncology for incidental finding of left gluteal mass on MRI surveillance for adrenal adenoma.  6/19 needle biopsy -> lipoma with spindle cells.  decision made to repeat MRI  in 1/2020, will follow up with surg onc\par \par DM2 controlled, last a1c 5.8%\par \par s/p D & C 1.5 years ago, no GYN follow up yet.  denies vaginal spotting\par \par no complaints

## 2020-01-01 ENCOUNTER — FORM ENCOUNTER (OUTPATIENT)
Age: 75
End: 2020-01-01

## 2020-01-02 ENCOUNTER — OUTPATIENT (OUTPATIENT)
Dept: OUTPATIENT SERVICES | Facility: HOSPITAL | Age: 75
LOS: 1 days | End: 2020-01-02
Payer: MEDICARE

## 2020-01-02 ENCOUNTER — APPOINTMENT (OUTPATIENT)
Dept: MRI IMAGING | Facility: HOSPITAL | Age: 75
End: 2020-01-02
Payer: MEDICARE

## 2020-01-02 DIAGNOSIS — Z90.89 ACQUIRED ABSENCE OF OTHER ORGANS: Chronic | ICD-10-CM

## 2020-01-02 DIAGNOSIS — Z98.890 OTHER SPECIFIED POSTPROCEDURAL STATES: Chronic | ICD-10-CM

## 2020-01-02 DIAGNOSIS — H26.9 UNSPECIFIED CATARACT: Chronic | ICD-10-CM

## 2020-01-02 DIAGNOSIS — R93.89 ABNORMAL FINDINGS ON DIAGNOSTIC IMAGING OF OTHER SPECIFIED BODY STRUCTURES: ICD-10-CM

## 2020-01-02 PROCEDURE — 73720 MRI LWR EXTREMITY W/O&W/DYE: CPT | Mod: 26,LT

## 2020-01-02 PROCEDURE — 73720 MRI LWR EXTREMITY W/O&W/DYE: CPT

## 2020-01-09 ENCOUNTER — FORM ENCOUNTER (OUTPATIENT)
Age: 75
End: 2020-01-09

## 2020-01-10 ENCOUNTER — OUTPATIENT (OUTPATIENT)
Dept: OUTPATIENT SERVICES | Facility: HOSPITAL | Age: 75
LOS: 1 days | End: 2020-01-10
Payer: MEDICARE

## 2020-01-10 ENCOUNTER — APPOINTMENT (OUTPATIENT)
Dept: ULTRASOUND IMAGING | Facility: HOSPITAL | Age: 75
End: 2020-01-10

## 2020-01-10 DIAGNOSIS — E04.1 NONTOXIC SINGLE THYROID NODULE: ICD-10-CM

## 2020-01-10 DIAGNOSIS — Z98.890 OTHER SPECIFIED POSTPROCEDURAL STATES: Chronic | ICD-10-CM

## 2020-01-10 DIAGNOSIS — Z90.89 ACQUIRED ABSENCE OF OTHER ORGANS: Chronic | ICD-10-CM

## 2020-01-10 DIAGNOSIS — H26.9 UNSPECIFIED CATARACT: Chronic | ICD-10-CM

## 2020-01-10 PROCEDURE — 76536 US EXAM OF HEAD AND NECK: CPT

## 2020-01-10 PROCEDURE — 76536 US EXAM OF HEAD AND NECK: CPT | Mod: 26

## 2020-01-16 ENCOUNTER — TRANSCRIPTION ENCOUNTER (OUTPATIENT)
Age: 75
End: 2020-01-16

## 2020-01-21 ENCOUNTER — LABORATORY RESULT (OUTPATIENT)
Age: 75
End: 2020-01-21

## 2020-01-21 ENCOUNTER — APPOINTMENT (OUTPATIENT)
Dept: SURGICAL ONCOLOGY | Facility: CLINIC | Age: 75
End: 2020-01-21
Payer: MEDICARE

## 2020-01-21 ENCOUNTER — APPOINTMENT (OUTPATIENT)
Dept: OTOLARYNGOLOGY | Facility: CLINIC | Age: 75
End: 2020-01-21
Payer: MEDICARE

## 2020-01-21 VITALS
WEIGHT: 257 LBS | BODY MASS INDEX: 50.45 KG/M2 | HEART RATE: 76 BPM | OXYGEN SATURATION: 93 % | DIASTOLIC BLOOD PRESSURE: 72 MMHG | HEIGHT: 60 IN | TEMPERATURE: 98.7 F | SYSTOLIC BLOOD PRESSURE: 151 MMHG

## 2020-01-21 VITALS
OXYGEN SATURATION: 100 % | SYSTOLIC BLOOD PRESSURE: 142 MMHG | HEIGHT: 60 IN | HEART RATE: 76 BPM | WEIGHT: 257 LBS | BODY MASS INDEX: 50.45 KG/M2 | TEMPERATURE: 97.9 F | DIASTOLIC BLOOD PRESSURE: 72 MMHG

## 2020-01-21 PROCEDURE — 99204 OFFICE O/P NEW MOD 45 MIN: CPT | Mod: 25

## 2020-01-21 PROCEDURE — 10021 FNA BX W/O IMG GDN 1ST LES: CPT

## 2020-01-21 PROCEDURE — 99214 OFFICE O/P EST MOD 30 MIN: CPT

## 2020-01-21 NOTE — CONSULT LETTER
[Dear  ___] : Dear  [unfilled], [Courtesy Letter:] : I had the pleasure of seeing your patient, [unfilled], in my office today. [Please see my note below.] : Please see my note below. [Consult Closing:] : Thank you very much for allowing me to participate in the care of this patient.  If you have any questions, please do not hesitate to contact me. [Sincerely,] : Sincerely, [FreeTextEntry2] : DR Jemal Darden [FreeTextEntry3] : \par Shiva Montoya MD, FACS\par \par Otolaryngology-Head and Neck Surgery\par [employer] [facility]\par

## 2020-01-21 NOTE — HISTORY OF PRESENT ILLNESS
[de-identified] : 74 year-old female presented in April 2019  for an abdominal MRI for surveillance of an adrenal adenoma. Study demonstrated a stable 1.1 cm left adrenal adenoma, benign-appearing liver lesion and an incidental 4.5 cm mass in the left gluteal region. As such, she was referred for an MRI of the left hip which revealed a 4.8 cm soft tissue lesion with central necrosis interposed between the left gluteus medius and minimus muscles.  Patient reported no symptoms other than intermittent chronic back and sciatic type pain. Patient is s/p CT guided percutaneous needle biopsy of left gluteal mass on 05/06/2019 by IR. 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 \par \par We discussed surgical excision, however, she opted for ongoing MRI surveillance.  Most recent MRI in January 2020 re demonstrated the mass which is decreased in size from prior exam, currently measuring 4.1 cm. \par \par She underwent US guided FNA of a right thyroid lobe nodule this morning with Dr. Shiva Montoya. \par \par Her past medical history includes HTN, DM 2, hypothyroidism, PE in 2014 (felt secondary to prolonged medroxyprogesterone use for dysfunctional uterine bleeding) she was on Coumadin for 3 years but was advised to stop per her hematologist. \par \par She is feeling well and denies any symptoms related to the mass. \par

## 2020-01-21 NOTE — PHYSICAL EXAM
[Normal] : supple, no neck mass and thyroid not enlarged [Normal Supraclavicular Lymph Nodes] : normal supraclavicular lymph nodes [Normal Groin Lymph Nodes] : normal groin lymph nodes [Normal] : oriented to person, place and time, with appropriate affect [de-identified] : left gluteal mass not palpable

## 2020-01-21 NOTE — CONSULT LETTER
[Courtesy Letter:] : I had the pleasure of seeing your patient, [unfilled], in my office today. [Dear  ___] : Dear  [unfilled], [Please see my note below.] : Please see my note below. [Consult Closing:] : Thank you very much for allowing me to participate in the care of this patient.  If you have any questions, please do not hesitate to contact me. [Sincerely,] : Sincerely, [FreeTextEntry1] : I will keep you informed of the follow-up in July. [FreeTextEntry3] : Jean Phillips MD FACS\par Chief of Surgical Oncology\par \par

## 2020-01-21 NOTE — ASSESSMENT
[FreeTextEntry1] : IMP: \par Left gluteal spindle cell lipoma\par Decreased in size on most recent MRI\par \par Plan:\par Observation only at this point\par RTO in 6 months after repeat MRI (7/2020)\par \par

## 2020-01-21 NOTE — HISTORY OF PRESENT ILLNESS
[de-identified] : This is a patient referred by Dr Félix Darden for thyroid nodule. This was found many years ago during physical exam/US.\par No dysphagia, dysphonia or dyspnea.\par US 1/10/2020 1.9 cm right lower pole heterogeneous nodule. Biopsy advised. The left lobe is small. \par previous FNA many years , neg for cancer per pt .\par Patient denies h/o radiation and has no family h/o thyroid cancer.\par

## 2020-01-24 ENCOUNTER — TRANSCRIPTION ENCOUNTER (OUTPATIENT)
Age: 75
End: 2020-01-24

## 2020-02-28 LAB — HEMOCCULT STL QL IA: NEGATIVE

## 2020-03-17 ENCOUNTER — APPOINTMENT (OUTPATIENT)
Dept: INTERNAL MEDICINE | Facility: CLINIC | Age: 75
End: 2020-03-17
Payer: MEDICARE

## 2020-03-17 VITALS
TEMPERATURE: 97.5 F | HEIGHT: 60 IN | OXYGEN SATURATION: 96 % | RESPIRATION RATE: 16 BRPM | BODY MASS INDEX: 49.87 KG/M2 | HEART RATE: 75 BPM | SYSTOLIC BLOOD PRESSURE: 147 MMHG | WEIGHT: 254 LBS | DIASTOLIC BLOOD PRESSURE: 83 MMHG

## 2020-03-17 DIAGNOSIS — Z91.09 OTHER ALLERGY STATUS, OTHER THAN TO DRUGS AND BIOLOGICAL SUBSTANCES: ICD-10-CM

## 2020-03-17 PROCEDURE — 99214 OFFICE O/P EST MOD 30 MIN: CPT

## 2020-03-17 NOTE — HISTORY OF PRESENT ILLNESS
[de-identified] : 74yoF PMH DM2, HTN, hypothyroidism, adrenal adenoma (stable), left gluteal lipoma, provoked PE while on progesterone (off Coumadin since 5/18), varicose veins presents for follow up\par \par endorses scratchy throat, nasal congestion, dry cough x 4 days.  denies recent travel, known COVID exposure, fever/chills/body aches

## 2020-03-24 ENCOUNTER — TRANSCRIPTION ENCOUNTER (OUTPATIENT)
Age: 75
End: 2020-03-24

## 2020-04-27 ENCOUNTER — TRANSCRIPTION ENCOUNTER (OUTPATIENT)
Age: 75
End: 2020-04-27

## 2020-04-30 ENCOUNTER — APPOINTMENT (OUTPATIENT)
Dept: INTERNAL MEDICINE | Facility: CLINIC | Age: 75
End: 2020-04-30

## 2020-06-22 ENCOUNTER — TRANSCRIPTION ENCOUNTER (OUTPATIENT)
Age: 75
End: 2020-06-22

## 2020-07-10 ENCOUNTER — TRANSCRIPTION ENCOUNTER (OUTPATIENT)
Age: 75
End: 2020-07-10

## 2020-07-14 ENCOUNTER — RESULT REVIEW (OUTPATIENT)
Age: 75
End: 2020-07-14

## 2020-07-14 ENCOUNTER — OUTPATIENT (OUTPATIENT)
Dept: OUTPATIENT SERVICES | Facility: HOSPITAL | Age: 75
LOS: 1 days | End: 2020-07-14
Payer: MEDICARE

## 2020-07-14 ENCOUNTER — APPOINTMENT (OUTPATIENT)
Dept: MRI IMAGING | Facility: HOSPITAL | Age: 75
End: 2020-07-14
Payer: MEDICARE

## 2020-07-14 DIAGNOSIS — H26.9 UNSPECIFIED CATARACT: Chronic | ICD-10-CM

## 2020-07-14 DIAGNOSIS — Z98.890 OTHER SPECIFIED POSTPROCEDURAL STATES: Chronic | ICD-10-CM

## 2020-07-14 DIAGNOSIS — Z90.89 ACQUIRED ABSENCE OF OTHER ORGANS: Chronic | ICD-10-CM

## 2020-07-14 DIAGNOSIS — M79.89 OTHER SPECIFIED SOFT TISSUE DISORDERS: ICD-10-CM

## 2020-07-14 PROCEDURE — 73720 MRI LWR EXTREMITY W/O&W/DYE: CPT | Mod: 26,LT

## 2020-07-14 PROCEDURE — 73720 MRI LWR EXTREMITY W/O&W/DYE: CPT

## 2020-07-17 ENCOUNTER — APPOINTMENT (OUTPATIENT)
Dept: INTERNAL MEDICINE | Facility: CLINIC | Age: 75
End: 2020-07-17
Payer: MEDICARE

## 2020-07-17 ENCOUNTER — APPOINTMENT (OUTPATIENT)
Dept: ENDOCRINOLOGY | Facility: CLINIC | Age: 75
End: 2020-07-17
Payer: MEDICARE

## 2020-07-17 VITALS
WEIGHT: 248 LBS | HEIGHT: 60 IN | BODY MASS INDEX: 48.69 KG/M2 | OXYGEN SATURATION: 96 % | DIASTOLIC BLOOD PRESSURE: 78 MMHG | SYSTOLIC BLOOD PRESSURE: 134 MMHG | TEMPERATURE: 98.4 F | RESPIRATION RATE: 16 BRPM | HEART RATE: 81 BPM

## 2020-07-17 VITALS
WEIGHT: 248 LBS | BODY MASS INDEX: 48.69 KG/M2 | TEMPERATURE: 98.4 F | HEIGHT: 60 IN | RESPIRATION RATE: 16 BRPM | OXYGEN SATURATION: 96 % | DIASTOLIC BLOOD PRESSURE: 78 MMHG | HEART RATE: 81 BPM | SYSTOLIC BLOOD PRESSURE: 134 MMHG

## 2020-07-17 DIAGNOSIS — Z12.39 ENCOUNTER FOR OTHER SCREENING FOR MALIGNANT NEOPLASM OF BREAST: ICD-10-CM

## 2020-07-17 LAB — GLUCOSE BLDC GLUCOMTR-MCNC: 99

## 2020-07-17 PROCEDURE — 82962 GLUCOSE BLOOD TEST: CPT

## 2020-07-17 PROCEDURE — 99214 OFFICE O/P EST MOD 30 MIN: CPT

## 2020-07-17 PROCEDURE — 99214 OFFICE O/P EST MOD 30 MIN: CPT | Mod: 25

## 2020-07-17 NOTE — PHYSICAL EXAM
[No Respiratory Distress] : no respiratory distress  [Normal] : normal gait, coordination grossly intact, no focal deficits and deep tendon reflexes were 2+ and symmetric [Normal Rate] : normal rate

## 2020-07-17 NOTE — HISTORY OF PRESENT ILLNESS
[de-identified] : 74yoF PMH DM2, HTN, hypothyroidism, adrenal adenoma (stable), left gluteal lipoma, provoked PE while on progesterone (off Coumadin since 5/18), varicose veins presents for follow up\par \par doing well\par needs mammogram and US Rx\par \par reviewed recent labs : HDL 61, ,  with high 10 year ASCVD 44%\par \par advised that high risk for MI, CVA but patient declined statin understanding risks of doing so\par \par continues to try and lose weight

## 2020-07-17 NOTE — DATA REVIEWED
[FreeTextEntry1] : The FBS and hbA1c are stable. The thyroid test are normal. The LDL-C remains elevated. The FNA biopsy of the right thyroid nodule was negative.

## 2020-07-17 NOTE — HISTORY OF PRESENT ILLNESS
[FreeTextEntry1] : Patient feels well , she is asymptomatic. Her weight has not changed. She is exercising regularly and following the diet. Her blood glucose at home are not/well controlled, they are usually around 100 mg/dl. The FBS in the laboratory was 89 mg/dl and the HbA1c was 5.8 %, improved/unchanged/increased since last visit. The renal function is within normal limits, the microalbumin in the urine was normal. The lipid panel remains abnormal. She denies low blood glucose during the night. She denies chest pain, or SOB. Denies numbness, tingling or burning sensation on her extremities. Taking her medications regularly. She has not seen the Ophthalmologist recently. She has not seen Podiatrist recently. She has not seen the Cardiologist recently.\par

## 2020-07-17 NOTE — ASSESSMENT
[FreeTextEntry1] : The patient's diabetes is well controlled\par The hyperlipidemia remains not controlled\par She refuses treatment\par She is clinically euthyroid\par The thyroid nodule is benign\par Will continue the same treatment

## 2020-07-17 NOTE — PHYSICAL EXAM
[Alert] : alert [No Acute Distress] : no acute distress [No Respiratory Distress] : no respiratory distress [Clear to Auscultation] : lungs were clear to auscultation bilaterally [Normal PMI] : the apical impulse was normal [Normal Rate] : heart rate was normal

## 2020-07-28 ENCOUNTER — APPOINTMENT (OUTPATIENT)
Dept: SURGICAL ONCOLOGY | Facility: CLINIC | Age: 75
End: 2020-07-28
Payer: MEDICARE

## 2020-07-28 VITALS
SYSTOLIC BLOOD PRESSURE: 132 MMHG | WEIGHT: 248 LBS | HEIGHT: 60 IN | BODY MASS INDEX: 48.69 KG/M2 | HEART RATE: 74 BPM | DIASTOLIC BLOOD PRESSURE: 74 MMHG

## 2020-07-28 PROCEDURE — 99214 OFFICE O/P EST MOD 30 MIN: CPT

## 2020-07-28 NOTE — CONSULT LETTER
[Dear  ___] : Dear  [unfilled], [Courtesy Letter:] : I had the pleasure of seeing your patient, [unfilled], in my office today. [Consult Closing:] : Thank you very much for allowing me to participate in the care of this patient.  If you have any questions, please do not hesitate to contact me. [Please see my note below.] : Please see my note below. [Sincerely,] : Sincerely, [FreeTextEntry1] : i will keep you informed of my follow-up. [FreeTextEntry3] : Jean Phillips MD FACS\par Chief of Surgical Oncology\par \par

## 2020-07-28 NOTE — HISTORY OF PRESENT ILLNESS
[de-identified] : 74 year-old female presented in April 2019  for an abdominal MRI for surveillance of an adrenal adenoma. Study demonstrated a stable 1.1 cm left adrenal adenoma, benign-appearing liver lesion and an incidental 4.5 cm mass in the left gluteal region. As such, she was referred for an MRI of the left hip which revealed a 4.8 cm soft tissue lesion with central necrosis interposed between the left gluteus medius and minimus muscles.  Patient reported no symptoms other than intermittent chronic back and sciatic type pain. Patient is s/p CT guided percutaneous needle biopsy of left gluteal mass on 05/06/2019 by IR. 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 \par \par Follow-up MRI this month shows no change in size of the lipoma.\par \par We discussed surgical excision, however, she opted for ongoing MRI surveillance.  Most recent MRI in January 2020 re demonstrated the mass which is decreased in size from prior exam, currently measuring 4.1 cm. \par \par She underwent US guided FNA of a right thyroid lobe nodule 1/21/20 with Dr. Shiva Montoya. \par \par Surveillance MRI in July 2020 demonstrated stable to mild decrease in size of the left gluteus medius intramuscular mass.\par \par Her past medical history includes HTN, DM 2, hypothyroidism, PE in 2014 (felt secondary to prolonged medroxyprogesterone use for dysfunctional uterine bleeding) she was on Coumadin for 3 years but was advised to stop per her hematologist. \par \par She is feeling well and denies any symptoms related to the mass. \par

## 2020-07-28 NOTE — ASSESSMENT
[FreeTextEntry1] : IMP: \par Left gluteal spindle cell lipoma\par Stable to slight decrease in size on most recent MRI\par \par Plan:\par Observation only at this point\par RTO one year with follow-up MRI then PRN\par \par

## 2020-08-10 ENCOUNTER — OUTPATIENT (OUTPATIENT)
Dept: OUTPATIENT SERVICES | Facility: HOSPITAL | Age: 75
LOS: 1 days | End: 2020-08-10
Payer: MEDICARE

## 2020-08-10 ENCOUNTER — APPOINTMENT (OUTPATIENT)
Dept: ULTRASOUND IMAGING | Facility: HOSPITAL | Age: 75
End: 2020-08-10
Payer: MEDICARE

## 2020-08-10 ENCOUNTER — RESULT REVIEW (OUTPATIENT)
Age: 75
End: 2020-08-10

## 2020-08-10 DIAGNOSIS — H26.9 UNSPECIFIED CATARACT: Chronic | ICD-10-CM

## 2020-08-10 DIAGNOSIS — Z90.89 ACQUIRED ABSENCE OF OTHER ORGANS: Chronic | ICD-10-CM

## 2020-08-10 DIAGNOSIS — E04.1 NONTOXIC SINGLE THYROID NODULE: ICD-10-CM

## 2020-08-10 DIAGNOSIS — Z98.890 OTHER SPECIFIED POSTPROCEDURAL STATES: Chronic | ICD-10-CM

## 2020-08-10 PROCEDURE — 76536 US EXAM OF HEAD AND NECK: CPT

## 2020-08-10 PROCEDURE — 76536 US EXAM OF HEAD AND NECK: CPT | Mod: 26

## 2020-08-28 ENCOUNTER — TRANSCRIPTION ENCOUNTER (OUTPATIENT)
Age: 75
End: 2020-08-28

## 2020-11-19 ENCOUNTER — APPOINTMENT (OUTPATIENT)
Dept: ENDOCRINOLOGY | Facility: CLINIC | Age: 75
End: 2020-11-19
Payer: MEDICARE

## 2020-11-19 VITALS
HEART RATE: 76 BPM | SYSTOLIC BLOOD PRESSURE: 143 MMHG | HEIGHT: 60 IN | BODY MASS INDEX: 46.33 KG/M2 | RESPIRATION RATE: 16 BRPM | TEMPERATURE: 98.1 F | DIASTOLIC BLOOD PRESSURE: 77 MMHG | WEIGHT: 236 LBS | OXYGEN SATURATION: 98 %

## 2020-11-19 LAB — GLUCOSE BLDC GLUCOMTR-MCNC: 95

## 2020-11-19 PROCEDURE — 99214 OFFICE O/P EST MOD 30 MIN: CPT | Mod: 25

## 2020-11-19 PROCEDURE — 82962 GLUCOSE BLOOD TEST: CPT

## 2020-11-19 NOTE — ASSESSMENT
[FreeTextEntry1] : Patient's diabetes is well controlled\par Taking the medications regularly\par Advised to keep following a low CHO, low fat diet\par Advised to exercise regularly\par She has lost weight\par The hyperlipidemia is well controlled\par Will continue the same medications\par Advise to take the medications regularly\par \par \par \par

## 2020-11-19 NOTE — REASON FOR VISIT
[Follow - Up] : a follow-up visit [DM Type 2] : DM Type 2 [Adrenal Evaluation/Adrenal Disorder] : adrenal evaluation/adrenal disorder [Hypothyroidism] : hypothyroidism [Thyroid nodule/ MNG] : thyroid nodule/ MNG [Other___] : [unfilled]

## 2020-11-19 NOTE — HISTORY OF PRESENT ILLNESS
[FreeTextEntry1] : Patient feels well , she is asymptomatic. Her weight has decreased significantly. She is walking regularly and following the diet. Her blood glucose at home are well controlled, they are usually around 95 mg/dl. The FBS in the laboratory was 100 mg/dl and the HbA1c was 5.7 %. The renal function is within normal limits, the microalbumin in the urine was normal. The lipid panel was within normal limits. She denies low blood glucose during the night. She denies chest pain, or SOB. Denies numbness, tingling or burning sensation on her extremities. Taking her medications regularly. She has not seen the Ophthalmologist recently. She has not seen Podiatrist recently. She has seen the Cardiologist recently. She saw Dr. Phillips for the adrenal adenoma. \par

## 2020-11-19 NOTE — DATA REVIEWED
[FreeTextEntry1] : The FBS and HbA1c are good. The US thyroid is stable. The adrenal adenoma is stable.

## 2021-01-14 ENCOUNTER — APPOINTMENT (OUTPATIENT)
Dept: INTERNAL MEDICINE | Facility: CLINIC | Age: 76
End: 2021-01-14
Payer: MEDICARE

## 2021-01-14 VITALS
HEIGHT: 60 IN | RESPIRATION RATE: 16 BRPM | OXYGEN SATURATION: 98 % | WEIGHT: 237 LBS | DIASTOLIC BLOOD PRESSURE: 74 MMHG | TEMPERATURE: 97.7 F | BODY MASS INDEX: 46.53 KG/M2 | HEART RATE: 74 BPM | SYSTOLIC BLOOD PRESSURE: 141 MMHG

## 2021-01-14 PROCEDURE — 99213 OFFICE O/P EST LOW 20 MIN: CPT

## 2021-01-14 RX ORDER — AMMONIUM LACTATE 12 %
12 CREAM (GRAM) TOPICAL
Refills: 0 | Status: DISCONTINUED | COMMUNITY
Start: 2017-08-09 | End: 2021-01-14

## 2021-01-14 NOTE — HISTORY OF PRESENT ILLNESS
[FreeTextEntry1] : BP follow up [de-identified] : 75yoF PMH DM2, HTN, hypothyroidism, adrenal adenoma (stable), left gluteal lipoma presents for follow up\par \par endo - DM controlled, cw current management\par agrees to starting statin\par \par annual follow up with Dr Phillips\par \par COVID vaccine next week

## 2021-03-16 ENCOUNTER — RESULT REVIEW (OUTPATIENT)
Age: 76
End: 2021-03-16

## 2021-03-16 ENCOUNTER — APPOINTMENT (OUTPATIENT)
Dept: ENDOCRINOLOGY | Facility: CLINIC | Age: 76
End: 2021-03-16
Payer: MEDICARE

## 2021-03-16 VITALS
SYSTOLIC BLOOD PRESSURE: 147 MMHG | HEART RATE: 77 BPM | DIASTOLIC BLOOD PRESSURE: 79 MMHG | TEMPERATURE: 97.3 F | BODY MASS INDEX: 47.32 KG/M2 | HEIGHT: 60 IN | WEIGHT: 241 LBS | RESPIRATION RATE: 16 BRPM | OXYGEN SATURATION: 99 %

## 2021-03-16 LAB — GLUCOSE BLDC GLUCOMTR-MCNC: 95

## 2021-03-16 PROCEDURE — 82962 GLUCOSE BLOOD TEST: CPT

## 2021-03-16 PROCEDURE — 99214 OFFICE O/P EST MOD 30 MIN: CPT | Mod: 25

## 2021-03-16 NOTE — DATA REVIEWED
[FreeTextEntry1] : The FBS and hbA1c indicates good diabetic control. The TSH and free t4 were normal. The last US thyroid was 10/20 and it was stable

## 2021-03-16 NOTE — HISTORY OF PRESENT ILLNESS
[FreeTextEntry1] : Patient feels well , she is asymptomatic. Her weight has not changed. She is walking regularly and following the diet. Her blood glucose at home are well controlled, they are usually around 110 mg/dl. The FBS in the laboratory was 97 mg/dl and the HbA1c was 5.7 %. The renal function is within normal limits, the microalbumin in the urine was normal. The lipid panel was within normal limits. She denies low blood glucose during the night. She denies chest pain, or SOB. Denies numbness, tingling or burning sensation on her extremities. Taking her medications regularly. \par

## 2021-03-16 NOTE — REASON FOR VISIT
[DM Type 2] : DM Type 2 [Follow - Up] : a follow-up visit [Hypothyroidism] : hypothyroidism [Thyroid nodule/ MNG] : thyroid nodule/ MNG

## 2021-03-16 NOTE — ASSESSMENT
[FreeTextEntry1] : Patient is doing well\par No recent weight loss\par The diabetes is well controlled\par She is clinically euthyroid\par Will continue the same treatment\par Will repeat the blood tests\par Will repeat the US thyroid

## 2021-03-18 NOTE — H&P PST ADULT - OPHTHALMOLOGIC COMMENTS
Surgical Oncology Surveillance:    Referring:  Jaylen Reyes MD  Primary:Zackary Valderrama MD  Care Team:  Patient Care Team:  Zackary Valderrama MD as PCP - General (Internal Medicine)  Alli Evans MD (Gastroenterology)  Zackary Valderrama MD (Internal Medicine)    Diagnosis: cancer of the esophagus  Stage:  ypT1a pN1 cM0  Procedure & Date:     3/3/21 Robotic laparoscopic transhiatal esophagectomy with transcervical endoscopic esophageal mobilization.  Chemical pyloroplasty.       Post operative course was without complication, discharged 3/8/21.    Chief Complaint & History    Ari is seen today for post op visit.     Current concerns:  Is surprised how well he is doing. No weight issues. Some coughing with talking and in the morning, thoracic is changing the PPI to see if that helps.      Medications:    Current Outpatient Medications   Medication Sig Dispense Refill   • metFORMIN (GLUCOPHAGE) 500 MG tablet TAKE 1 TABLET BY MOUTH TWICE DAILY WITH MEALS 180 tablet 0   • furosemide (LASIX) 40 MG tablet TAKE 1 TABLET BY MOUTH IN THE MORNING AND 1/2 (ONE-HALF) IN THE EVENING 135 tablet 0   • atorvastatin (LIPITOR) 10 MG tablet Take 1 tablet by mouth once daily 90 tablet 0   • spironolactone (ALDACTONE) 25 MG tablet Take 1 tablet by mouth twice daily 180 tablet 0   • labetalol (NORMODYNE) 200 MG tablet Take 1 tablet by mouth twice daily 180 tablet 0   • Insulin Pen Needle (BD Pen Needle Sabra 2nd Gen) 32G X 4 MM Misc Inject 300 each into the skin 4 times daily. Use to inject insulin four times daily. Remove needle cover(s) to expose needle before injecting. 300 each 0   • simethicone (MYLICON) 125 MG chewable tablet Chew 1 tablet by mouth every 6 hours as needed for Flatulence. 20 tablet 0   • pantoprazole (PROTONIX) 20 MG tablet Take 1 tablet by mouth 2 times daily. 60 tablet 2   • blood glucose (OneTouch Ultra) test strip USE 1 STRIP TO CHECK GLUCOSE 3 TIMES DAILY 100 each 11   • Insulin Lispro, 1  Unit Dial, (HumaLOG KwikPen) 100 UNIT/ML pen-injector INJECT 5 UNITS SUBCUTANEOUSLY THREE TIMES DAILY BEFORE MEAL(S) 15 mL 11   • OneTouch Delica Lancets 33G Misc USE 1  TO CHECK GLUCOSE 4 TIMES DAILY 400 each 0   • ondansetron (ZOFRAN) 8 MG tablet Take 1 tablet by mouth every 8 hours as needed for Nausea. Take for 2 days post chemotherapy. 24 tablet 1   • prochlorperazine (COMPAZINE) 10 MG tablet Take 1 tablet by mouth every 6 hours as needed for Nausea or Vomiting. 30 tablet 5   • insulin glargine (Lantus SoloStar) 100 UNIT/ML pen-injector Inject 16 Units into the skin nightly. Prime 2 units before each dose. 15 mL 1   • tiZANidine (ZANAFLEX) 4 MG tablet Take 1 tablet by mouth every 8 hours as needed (spasm). 30 tablet 3   • Insulin Syringe-Needle U-100 31G X 5/16\" 0.3 ML Misc Use to inject insulin 4 times a day. 100 each 5   • Blood Glucose Monitoring Suppl (ONE TOUCH ULTRA 2) w/Device Kit Check blood sugar 4 times a day as directed. 1 kit 0     No current facility-administered medications for this visit.           Allergies:     ALLERGIES:  No Known Allergies       Physical Exam   Visit Vitals  /82 (BP Location: LUE - Left upper extremity, Patient Position: Sitting, Cuff Size: Large Adult)   Pulse 99   Ht 5' 10\" (1.778 m)   Wt 108 kg   SpO2 98%   BMI 34.16 kg/m²       Abdomen: Soft, nontender, nondistended.  Incisions are healing well with no erythema, swelling, or drainage.      Labs   Lab Results   Component Value Date    WBC 5.6 03/08/2021          Lab Results   Component Value Date    BUN 18 03/08/2021    BCRAT 20 03/08/2021    GLOB 3.6 02/17/2021    GPT 30 02/17/2021    FSTS1 14 10/03/2020    FSTS1 14 10/03/2020    CO2 23 03/08/2021    GFRNA 60 01/14/2020    AGR 1.0 02/17/2021           Pathology   A and B.   Gastroesophageal junction, resection with separately submitted proximal esophageal margin:  -Intramucosal moderately differentiated adenocarcinoma invading into but not through the reduplicated  muscularis mucosa, status post neoadjuvant radiation and chemotherapy, (ypT1a lesion).  -All margins of resection are free of malignancy.  - 1 out of 10 lymph nodes involved by metastatic adenocarcinoma (ypN1), please see comment.    Impression/Plan:  Esophageal cancer  Healing well from surgery   Lifting restrictions for 8 weeks.     F/u prn.     Thank you for the opportunity to take part in the care of your patient, please feel free to call with any questions or concerns.      Kendra Ford NP  Surgical Oncology      [address], [address]  [place] [address]  Office:  [phone]  Fax:  [phone]                             glasses for distance and reading

## 2021-06-11 ENCOUNTER — OUTPATIENT (OUTPATIENT)
Dept: OUTPATIENT SERVICES | Facility: HOSPITAL | Age: 76
LOS: 1 days | End: 2021-06-11
Payer: MEDICARE

## 2021-06-11 ENCOUNTER — APPOINTMENT (OUTPATIENT)
Dept: ULTRASOUND IMAGING | Facility: HOSPITAL | Age: 76
End: 2021-06-11
Payer: MEDICARE

## 2021-06-11 DIAGNOSIS — Z90.89 ACQUIRED ABSENCE OF OTHER ORGANS: Chronic | ICD-10-CM

## 2021-06-11 DIAGNOSIS — H26.9 UNSPECIFIED CATARACT: Chronic | ICD-10-CM

## 2021-06-11 DIAGNOSIS — E11.9 TYPE 2 DIABETES MELLITUS WITHOUT COMPLICATIONS: ICD-10-CM

## 2021-06-11 DIAGNOSIS — Z98.890 OTHER SPECIFIED POSTPROCEDURAL STATES: Chronic | ICD-10-CM

## 2021-06-11 PROCEDURE — 76536 US EXAM OF HEAD AND NECK: CPT

## 2021-06-11 PROCEDURE — 76536 US EXAM OF HEAD AND NECK: CPT | Mod: 26

## 2021-07-06 ENCOUNTER — APPOINTMENT (OUTPATIENT)
Dept: ENDOCRINOLOGY | Facility: CLINIC | Age: 76
End: 2021-07-06
Payer: MEDICARE

## 2021-07-06 ENCOUNTER — APPOINTMENT (OUTPATIENT)
Dept: INTERNAL MEDICINE | Facility: CLINIC | Age: 76
End: 2021-07-06
Payer: MEDICARE

## 2021-07-06 VITALS
BODY MASS INDEX: 47.51 KG/M2 | SYSTOLIC BLOOD PRESSURE: 145 MMHG | OXYGEN SATURATION: 97 % | TEMPERATURE: 97.6 F | HEART RATE: 82 BPM | HEIGHT: 60 IN | DIASTOLIC BLOOD PRESSURE: 77 MMHG | WEIGHT: 242 LBS | RESPIRATION RATE: 16 BRPM

## 2021-07-06 VITALS
HEART RATE: 82 BPM | SYSTOLIC BLOOD PRESSURE: 145 MMHG | RESPIRATION RATE: 16 BRPM | TEMPERATURE: 97.6 F | OXYGEN SATURATION: 97 % | WEIGHT: 242 LBS | HEIGHT: 60 IN | DIASTOLIC BLOOD PRESSURE: 77 MMHG | BODY MASS INDEX: 47.51 KG/M2

## 2021-07-06 LAB — GLUCOSE BLDC GLUCOMTR-MCNC: 103

## 2021-07-06 PROCEDURE — 99214 OFFICE O/P EST MOD 30 MIN: CPT

## 2021-07-06 PROCEDURE — 99214 OFFICE O/P EST MOD 30 MIN: CPT | Mod: 25

## 2021-07-06 PROCEDURE — 82962 GLUCOSE BLOOD TEST: CPT

## 2021-07-06 NOTE — HISTORY OF PRESENT ILLNESS
[FreeTextEntry1] : Patient is doing well, denies feeling tired, having cold intolerance, or palpitations. Denies dryness of the skin or hair loss. She denies chest pain or SOB. Taking the Levothyroxine regularly ½ hour before breakfast. Her weight has not changed.  The thyroid tests are within normal limits. The US thyroid is unchanged.\par

## 2021-07-06 NOTE — ASSESSMENT
[FreeTextEntry1] : Patient is clinically euthyroid\par Her weight is stable\par Her US thyroid is unchanged\par Will order a new US thyroid before next visit\par Will repeat the thyroid function tests before next visit\par Patient's diabetes is well controlled/has improved\par She is taking the medications regularly\par Advised to follow a low CHO, low fat diet and exercise regularly\par The hyperlipidemia is well controlled\par Advise to take the medications regularly\par The blood tests will be repeated before next visit\par Will continue the same thyroid medication\par \par \par \par

## 2021-07-07 RX ORDER — CHLORHEXIDINE GLUCONATE, 0.12% ORAL RINSE 1.2 MG/ML
0.12 SOLUTION DENTAL
Qty: 946 | Refills: 0 | Status: DISCONTINUED | COMMUNITY
Start: 2021-02-25

## 2021-07-07 RX ORDER — CLOTRIMAZOLE 10 MG/G
1 CREAM TOPICAL
Qty: 45 | Refills: 0 | Status: DISCONTINUED | COMMUNITY
Start: 2021-06-23

## 2021-07-07 RX ORDER — TRIAMCINOLONE ACETONIDE 1 MG/G
0.1 OINTMENT TOPICAL
Qty: 80 | Refills: 0 | Status: DISCONTINUED | COMMUNITY
Start: 2021-02-08

## 2021-07-07 NOTE — HISTORY OF PRESENT ILLNESS
[FreeTextEntry1] : BP follow up [de-identified] : 75yoF PMH DM2, HTN, hypothyroidism, adrenal adenoma (stable), left gluteal lipoma presents for follow up\par \par podiatry - performed CARON - ?increased velocity LLE\par CARON > 1 b/l LE\par denies claudication\par declined vascular surgery follow up\par \par upcoming MR for left gluteal lipoma - will f/u surg onc \par \par declined increase in antihypertensive

## 2021-07-08 ENCOUNTER — APPOINTMENT (OUTPATIENT)
Dept: MRI IMAGING | Facility: HOSPITAL | Age: 76
End: 2021-07-08
Payer: MEDICARE

## 2021-07-08 ENCOUNTER — OUTPATIENT (OUTPATIENT)
Dept: OUTPATIENT SERVICES | Facility: HOSPITAL | Age: 76
LOS: 1 days | End: 2021-07-08
Payer: MEDICARE

## 2021-07-08 DIAGNOSIS — Z98.890 OTHER SPECIFIED POSTPROCEDURAL STATES: Chronic | ICD-10-CM

## 2021-07-08 DIAGNOSIS — H26.9 UNSPECIFIED CATARACT: Chronic | ICD-10-CM

## 2021-07-08 DIAGNOSIS — Z90.89 ACQUIRED ABSENCE OF OTHER ORGANS: Chronic | ICD-10-CM

## 2021-07-08 DIAGNOSIS — M79.89 OTHER SPECIFIED SOFT TISSUE DISORDERS: ICD-10-CM

## 2021-07-08 PROCEDURE — 73720 MRI LWR EXTREMITY W/O&W/DYE: CPT

## 2021-07-08 PROCEDURE — 73720 MRI LWR EXTREMITY W/O&W/DYE: CPT | Mod: 26,LT,MH

## 2021-07-27 ENCOUNTER — APPOINTMENT (OUTPATIENT)
Dept: SURGICAL ONCOLOGY | Facility: CLINIC | Age: 76
End: 2021-07-27
Payer: MEDICARE

## 2021-07-27 VITALS
DIASTOLIC BLOOD PRESSURE: 76 MMHG | HEART RATE: 81 BPM | BODY MASS INDEX: 47.91 KG/M2 | HEIGHT: 60 IN | WEIGHT: 244 LBS | OXYGEN SATURATION: 95 % | SYSTOLIC BLOOD PRESSURE: 154 MMHG

## 2021-07-27 VITALS — TEMPERATURE: 97.1 F

## 2021-07-27 PROCEDURE — 99214 OFFICE O/P EST MOD 30 MIN: CPT

## 2021-07-27 NOTE — CONSULT LETTER
[Dear  ___] : Dear  [unfilled], [Courtesy Letter:] : I had the pleasure of seeing your patient, [unfilled], in my office today. [Please see my note below.] : Please see my note below. [Consult Closing:] : Thank you very much for allowing me to participate in the care of this patient.  If you have any questions, please do not hesitate to contact me. [Sincerely,] : Sincerely, [FreeTextEntry1] : I will keep you informed of my follow-up. [FreeTextEntry3] : Jean Phillips MD FACS\par Chief of Surgical Oncology\par \par

## 2021-07-27 NOTE — HISTORY OF PRESENT ILLNESS
[de-identified] : 75 year-old female who presents for ongoing follow up. \par \par She had initially presented in April 2019  for an abdominal MRI for surveillance of an adrenal adenoma. Study demonstrated a stable 1.1 cm left adrenal adenoma, benign-appearing liver lesion and an incidental 4.5 cm mass in the left gluteal region. As such, she was referred for an MRI of the left hip which revealed a 4.8 cm soft tissue lesion with central necrosis interposed between the left gluteus medius and minimus muscles.  Patient reported no symptoms other than intermittent chronic back and sciatic type pain. Patient is s/p CT guided percutaneous needle biopsy of left gluteal mass on 05/06/2019 by IR. 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 \par \par Follow-up MRI this month shows no change in size of the lipoma.\par \par We discussed surgical excision, however, she opted for ongoing MRI surveillance.  Most recent MRI in January 2020 re demonstrated the mass which is decreased in size from prior exam, currently measuring 4.1 cm. \par \par She underwent US guided FNA of a right thyroid lobe nodule 1/21/20 with Dr. Shiva Montoya. \par \par Surveillance MRI in July 2021 demonstrated enhancing soft tissue tumor in the left gluteus medius muscle. No interval change in size or appearance compared to the previous MRI.\par \par Her past medical history includes HTN, DM 2, hypothyroidism, PE in 2014 (felt secondary to prolonged medroxyprogesterone use for dysfunctional uterine bleeding) she was on Coumadin for 3 years but was advised to stop per her hematologist. \par \par She is feeling well and denies any symptoms related to the mass. \par

## 2021-07-27 NOTE — PHYSICAL EXAM
[Normal] : supple, no neck mass and thyroid not enlarged [Normal Neck Lymph Nodes] : normal neck lymph nodes  [Normal Supraclavicular Lymph Nodes] : normal supraclavicular lymph nodes [Normal Groin Lymph Nodes] : normal groin lymph nodes [Normal Axillary Lymph Nodes] : normal axillary lymph nodes [Normal] : oriented to person, place and time, with appropriate affect [de-identified] : no neurologic deficit in either leg

## 2021-07-27 NOTE — ASSESSMENT
[FreeTextEntry1] : IMP: \par Left gluteal spindle cell lipoma\par Enhancing soft tissue tumor in the left gluteus medius muscle. No interval change in size or appearance compared to the previous MRI.\par \par Plan:\par Observation only at this point\par RTO yearly with MRI\par \par

## 2021-10-26 ENCOUNTER — TRANSCRIPTION ENCOUNTER (OUTPATIENT)
Age: 76
End: 2021-10-26

## 2021-11-02 ENCOUNTER — APPOINTMENT (OUTPATIENT)
Dept: INTERNAL MEDICINE | Facility: CLINIC | Age: 76
End: 2021-11-02
Payer: MEDICARE

## 2021-11-02 DIAGNOSIS — M54.50 LOW BACK PAIN, UNSPECIFIED: ICD-10-CM

## 2021-11-02 PROCEDURE — 99442: CPT | Mod: 95

## 2021-11-03 PROBLEM — M54.50 LUMBAGO: Status: ACTIVE | Noted: 2021-11-02

## 2021-11-03 NOTE — HISTORY OF PRESENT ILLNESS
[Home] : at home, [unfilled] , at the time of the visit. [Medical Office: ([facility])___] : at the medical office located in  [Verbal consent obtained from patient] : the patient, [unfilled] [de-identified] : 76yoF PMH DM2, HTN, hypothyroidism, adrenal adenoma (stable), left gluteal lipoma presents for follow up\par \par last week, patient was sitting for long period playing board game\par endorses severe right low back pain with radiation to right thigh\par denies motor weakness, bowel or bladder dysfunction

## 2021-11-16 ENCOUNTER — APPOINTMENT (OUTPATIENT)
Dept: ENDOCRINOLOGY | Facility: CLINIC | Age: 76
End: 2021-11-16
Payer: MEDICARE

## 2021-11-16 VITALS
BODY MASS INDEX: 48.1 KG/M2 | SYSTOLIC BLOOD PRESSURE: 142 MMHG | HEIGHT: 60 IN | OXYGEN SATURATION: 97 % | RESPIRATION RATE: 16 BRPM | HEART RATE: 72 BPM | TEMPERATURE: 97.3 F | DIASTOLIC BLOOD PRESSURE: 74 MMHG | WEIGHT: 245 LBS

## 2021-11-16 LAB — GLUCOSE BLDC GLUCOMTR-MCNC: 105

## 2021-11-16 PROCEDURE — 99214 OFFICE O/P EST MOD 30 MIN: CPT | Mod: 25

## 2021-11-16 PROCEDURE — 82962 GLUCOSE BLOOD TEST: CPT

## 2021-11-16 NOTE — HISTORY OF PRESENT ILLNESS
[FreeTextEntry1] : Patient feels well , she is asymptomatic. Her weight has not changed. She is walking regularly and following the diet. Her blood glucose at home are well controlled, they are usually around 110 mg/dl. The FBS in the laboratory was 97 mg/dl and the HbA1c was 5.5 %. The renal function is within normal limits, the microalbumin in the urine was normal. The lipid panel was within normal limits. She denies low blood glucose during the night. She denies chest pain, or SOB. Denies numbness, tingling or burning sensation on her extremities. Taking her medications regularly. \par

## 2021-11-16 NOTE — ASSESSMENT
[FreeTextEntry1] : Good diabetic control\par No weight change\par The hyperlipidemia is stable\par She is clinically euthyroid\par The thyroid nodules were stable on 6/21\par The last MRI of the abdomen was 2019\par

## 2021-12-17 ENCOUNTER — NON-APPOINTMENT (OUTPATIENT)
Age: 76
End: 2021-12-17

## 2021-12-28 ENCOUNTER — EMERGENCY (EMERGENCY)
Facility: HOSPITAL | Age: 76
LOS: 1 days | Discharge: HOME | End: 2021-12-28
Attending: STUDENT IN AN ORGANIZED HEALTH CARE EDUCATION/TRAINING PROGRAM
Payer: MEDICARE

## 2021-12-28 ENCOUNTER — APPOINTMENT (OUTPATIENT)
Dept: INTERNAL MEDICINE | Facility: CLINIC | Age: 76
End: 2021-12-28
Payer: MEDICARE

## 2021-12-28 VITALS
BODY MASS INDEX: 47.71 KG/M2 | RESPIRATION RATE: 16 BRPM | HEIGHT: 60 IN | DIASTOLIC BLOOD PRESSURE: 71 MMHG | OXYGEN SATURATION: 96 % | HEART RATE: 71 BPM | WEIGHT: 243 LBS | TEMPERATURE: 97.7 F | SYSTOLIC BLOOD PRESSURE: 143 MMHG

## 2021-12-28 VITALS
TEMPERATURE: 99 F | WEIGHT: 244.05 LBS | OXYGEN SATURATION: 97 % | HEIGHT: 60 IN | DIASTOLIC BLOOD PRESSURE: 87 MMHG | SYSTOLIC BLOOD PRESSURE: 126 MMHG | RESPIRATION RATE: 16 BRPM | HEART RATE: 74 BPM

## 2021-12-28 DIAGNOSIS — H26.9 UNSPECIFIED CATARACT: Chronic | ICD-10-CM

## 2021-12-28 DIAGNOSIS — R29.898 OTHER SYMPTOMS AND SIGNS INVOLVING THE MUSCULOSKELETAL SYSTEM: ICD-10-CM

## 2021-12-28 DIAGNOSIS — Z90.89 ACQUIRED ABSENCE OF OTHER ORGANS: Chronic | ICD-10-CM

## 2021-12-28 DIAGNOSIS — Z98.890 OTHER SPECIFIED POSTPROCEDURAL STATES: Chronic | ICD-10-CM

## 2021-12-28 PROCEDURE — 99214 OFFICE O/P EST MOD 30 MIN: CPT

## 2021-12-28 PROCEDURE — 99284 EMERGENCY DEPT VISIT MOD MDM: CPT

## 2021-12-28 PROCEDURE — 72131 CT LUMBAR SPINE W/O DYE: CPT | Mod: 26,MA

## 2021-12-28 PROCEDURE — 99284 EMERGENCY DEPT VISIT MOD MDM: CPT | Mod: 25

## 2021-12-28 PROCEDURE — 72131 CT LUMBAR SPINE W/O DYE: CPT | Mod: MA

## 2021-12-28 NOTE — ED PROVIDER NOTE - NSICDXPASTMEDICALHX_GEN_ALL_CORE_FT
PAST MEDICAL HISTORY:  Diabetes mellitus     Essential hypertension     Hyperlipidemia, unspecified hyperlipidemia type     Hypothyroidism, unspecified type     Morbid obesity     Other pulmonary embolism without acute cor pulmonale, unspecified chronicity     Postmenopausal bleeding     Primary osteoarthritis of both knees     Seasonal allergic rhinitis, unspecified trigger

## 2021-12-28 NOTE — ED ADULT TRIAGE NOTE - CHIEF COMPLAINT QUOTE
numbness and weakness around right knee, started yesterday with lower back pain. Sent from PMD for CT of back

## 2021-12-28 NOTE — ED PROVIDER NOTE - PATIENT PORTAL LINK FT
You can access the FollowMyHealth Patient Portal offered by [facility] by registering at the following website: [email or web address]. By joining [employer]’s FollowMyHealth portal, you will also be able to view your health information using other applications (apps) compatible with our system.

## 2021-12-28 NOTE — ED PROVIDER NOTE - CLINICAL SUMMARY MEDICAL DECISION MAKING FREE TEXT BOX
76F presenting with right knee pain and numbness. has h/o of sciatica. non-focal neuro exam. Ct showing spinal stenosis. low concern for cord compression. will discharge with pcp follow-up.

## 2021-12-28 NOTE — ED PROVIDER NOTE - PHYSICAL EXAMINATION
General: well appearing female, no acute distress   HEENT: normocephalic, atraumatic   Respiratory: normal work of breathing   Abdomen: soft, non-tender, no guarding or rebound   MSK: no midline spinal tenderness, right knee tenderness to palpation, no erythema   Skin: warm, dry   Neuro: A&Ox3  Psych: appropriate affect

## 2021-12-28 NOTE — ED PROVIDER NOTE - NSFOLLOWUPINSTRUCTIONS_ED_ALL_ED_FT
You were seen in the emergency department for right knee pain and numbness likely caused by inflammation.     Please follow-up with your primary care doctor in the next 24-48 hours.     IF you have any worsening symptoms, severe leg pain, swelling, numbness, tingling, or difficulty walking, please return to the emergency department.

## 2021-12-28 NOTE — ED ADULT NURSE NOTE - OBJECTIVE STATEMENT
Patient presenting with lower back and right knee pain. pain initially subsided and then the patient developed right knee numbness causing her to nearly fall. no groin numbness, difficulty urinating or incontinence.

## 2021-12-28 NOTE — ED PROVIDER NOTE - OBJECTIVE STATEMENT
76F, pmh of htn, hld, sciatica, presenting with lower back and right knee pain. pain initially subsided and then the patient developed right knee numbness causing her to nearly fall. no groin numbness, difficulty urinating or incontinence. denies any falls or injuries.

## 2021-12-28 NOTE — ED PROVIDER NOTE - NSICDXPASTSURGICALHX_GEN_ALL_CORE_FT
PAST SURGICAL HISTORY:  Cataract of both eyes, unspecified cataract type s/p excision of cataracts bilaterally    H/O dilation and curettage in 2002 and 2007    History of tonsillectomy childhood

## 2021-12-28 NOTE — ED ADULT NURSE NOTE - NS ED NOTE ABUSE SUSPICION NEGLECT YN
Recommend 50 units of Botox. Patient elects Botox injection. 50units discarded, 50 units used. (discussed risks and benefits of BOTOX. ..). Lot # [id number], 8/23. No

## 2021-12-28 NOTE — ED ADULT NURSE NOTE - NSIMPLEMENTINTERV_GEN_ALL_ED
Implemented All Fall with Harm Risk Interventions:  [place] to call system. Call bell, personal items and telephone within reach. Instruct patient to call for assistance. Room bathroom lighting operational. Non-slip footwear when patient is off stretcher. Physically safe environment: no spills, clutter or unnecessary equipment. Stretcher in lowest position, wheels locked, appropriate side rails in place. Provide visual cue, wrist band, yellow gown, etc. Monitor gait and stability. Monitor for mental status changes and reorient to person, place, and time. Review medications for side effects contributing to fall risk. Reinforce activity limits and safety measures with patient and family. Provide visual clues: red socks.

## 2021-12-28 NOTE — ED PROVIDER NOTE - NSICDXFAMILYHX_GEN_ALL_CORE_FT
FAMILY HISTORY:  Father  Still living? No  Family history of heart disease, Age at diagnosis: Age Unknown    Mother  Still living? Yes, Estimated age: Age Unknown  Family history of cardiomegaly, Age at diagnosis: Age Unknown    Aunt  Still living? No  Diabetes mellitus, type 2, Age at diagnosis: Age Unknown

## 2021-12-29 PROBLEM — R29.898 WEAKNESS OF RIGHT LOWER EXTREMITY: Status: ACTIVE | Noted: 2021-12-28

## 2021-12-29 NOTE — PHYSICAL EXAM
[No Edema] : there was no peripheral edema [Normal] : affect was normal and insight and judgment were intact [de-identified] : + tenderness right paraspinal muscle [de-identified] : antalgic gait with cane, + numbness medial aspect knee, 4/5 strength right leg, 2+ reflexes, negative Babinski

## 2021-12-29 NOTE — HISTORY OF PRESENT ILLNESS
[FreeTextEntry8] : 76yoF PMH morbid obesity,  DM2, HTN, hypothyroidism, left gluteal lipoma presents with subacute onset back pain and leg weakness\par \par endorses sudden onset right low back pain with radiation to right leg last night\par endorses right leg weakness and numbness around right knee today\par denies fall\par denies bowel or bladder dysfunction nor saddle anesthesia\par \par denies trauma

## 2022-01-03 ENCOUNTER — TRANSCRIPTION ENCOUNTER (OUTPATIENT)
Age: 77
End: 2022-01-03

## 2022-01-03 DIAGNOSIS — R10.31 RIGHT LOWER QUADRANT PAIN: ICD-10-CM

## 2022-01-05 ENCOUNTER — OUTPATIENT (OUTPATIENT)
Dept: OUTPATIENT SERVICES | Facility: HOSPITAL | Age: 77
LOS: 1 days | End: 2022-01-05
Payer: MEDICARE

## 2022-01-05 DIAGNOSIS — H26.9 UNSPECIFIED CATARACT: Chronic | ICD-10-CM

## 2022-01-05 DIAGNOSIS — R10.31 RIGHT LOWER QUADRANT PAIN: ICD-10-CM

## 2022-01-05 DIAGNOSIS — Z98.890 OTHER SPECIFIED POSTPROCEDURAL STATES: Chronic | ICD-10-CM

## 2022-01-05 DIAGNOSIS — Z90.89 ACQUIRED ABSENCE OF OTHER ORGANS: Chronic | ICD-10-CM

## 2022-01-05 PROCEDURE — 73522 X-RAY EXAM HIPS BI 3-4 VIEWS: CPT | Mod: 26

## 2022-01-05 PROCEDURE — 73522 X-RAY EXAM HIPS BI 3-4 VIEWS: CPT

## 2022-01-06 ENCOUNTER — TRANSCRIPTION ENCOUNTER (OUTPATIENT)
Age: 77
End: 2022-01-06

## 2022-01-11 ENCOUNTER — APPOINTMENT (OUTPATIENT)
Dept: INTERNAL MEDICINE | Facility: CLINIC | Age: 77
End: 2022-01-11
Payer: MEDICARE

## 2022-01-11 PROCEDURE — 99442: CPT | Mod: 95

## 2022-01-12 ENCOUNTER — TRANSCRIPTION ENCOUNTER (OUTPATIENT)
Age: 77
End: 2022-01-12

## 2022-01-12 RX ORDER — BACLOFEN 5 MG/1
5 TABLET ORAL
Qty: 20 | Refills: 1 | Status: ACTIVE | COMMUNITY
Start: 2022-01-12 | End: 1900-01-01

## 2022-01-12 NOTE — HISTORY OF PRESENT ILLNESS
[FreeTextEntry1] : lumbar radiculopathy [Home] : at home, [unfilled] , at the time of the visit. [Medical Office: ([facility])___] : at the medical office located in  [Verbal consent obtained from patient] : the patient, [unfilled] [de-identified] : 76yoF PMH DM2, HTN, hypothyroidism, adrenal adenoma (stable), left gluteal lipoma presents for follow up sciatica\par \par back pain improved\par endorses mild numbness medial left knee\par unable to schedule PT\par no bowel or bladder dysfunction

## 2022-01-13 ENCOUNTER — NON-APPOINTMENT (OUTPATIENT)
Age: 77
End: 2022-01-13

## 2022-01-21 ENCOUNTER — APPOINTMENT (OUTPATIENT)
Dept: ORTHOPEDIC SURGERY | Facility: CLINIC | Age: 77
End: 2022-01-21
Payer: MEDICARE

## 2022-01-21 VITALS
HEART RATE: 80 BPM | SYSTOLIC BLOOD PRESSURE: 135 MMHG | HEIGHT: 60 IN | DIASTOLIC BLOOD PRESSURE: 67 MMHG | WEIGHT: 245 LBS | BODY MASS INDEX: 48.1 KG/M2

## 2022-01-21 VITALS — TEMPERATURE: 92.8 F

## 2022-01-21 DIAGNOSIS — M48.07 SPINAL STENOSIS, LUMBOSACRAL REGION: ICD-10-CM

## 2022-01-21 DIAGNOSIS — M43.16 SPONDYLOLISTHESIS, LUMBAR REGION: ICD-10-CM

## 2022-01-21 DIAGNOSIS — M41.9 SCOLIOSIS, UNSPECIFIED: ICD-10-CM

## 2022-01-21 DIAGNOSIS — M54.16 RADICULOPATHY, LUMBAR REGION: ICD-10-CM

## 2022-01-21 PROCEDURE — 99203 OFFICE O/P NEW LOW 30 MIN: CPT

## 2022-01-26 NOTE — PHYSICAL EXAM
[Antalgic] : antalgic [Cane] : ambulates with cane [de-identified] : Examination of the lumbar spine reveals no midline tenderness palpation, step-offs, or skin lesions. Decreased range of motion with respect to flexion, extension, lateral bending, and rotation. No tenderness to palpation of the sciatic notch. No tenderness palpation of the bilateral greater trochanters. No pain with passive internal/external rotation of the hips. No instability of bilateral lower extremities.  Negative JORGE. Negative straight leg raise bilaterally. No bowstring. Negative femoral stretch. 5 out of 5 iliopsoas, hip abductors, hips adductors, quadriceps, hamstrings, gastrocsoleus, tibialis anterior, extensor hallucis longus, peroneals. Grossly intact sensation to light touch bilateral lower extremities. 1+ patellar and Achilles reflexes. Downgoing Babinski. No clonus. Intact proprioception. Palpable pulses. No skin lesion and no edema on the right and left lower extremities. [de-identified] : Review of her CT scan does demonstrate multilevel stenosis.  Some degenerative scoliosis with some cascading spondylolisthesis

## 2022-01-26 NOTE — HISTORY OF PRESENT ILLNESS
[de-identified] : Ms. EMMANUEL VEGA  is a 76 year old female who presents with right lumbar radiculopathy since the end of December.   She thinks it began because she lifted something heavy.  She went to the ER on 12/28/21 because her pain was so bad.  She has a lot of right groin pain.  Normal bowel and bladder control.   Denies any recent fevers, chills, sweats, weight loss, or infection.\par \par The patients past medical history, past surgical history, medications, allergies, and social history were reviewed by me today with the patient and documented accordingly.  In addition, the patient's family history, which is noncontributory to their visit, was also reviewed.\par

## 2022-01-26 NOTE — DISCUSSION/SUMMARY
[de-identified] : We discussed further treatment options both nonsurgical and surgical.  At this point she is engaging in some physical therapy and has been referred to pain management.  I am in agreement with her trying these options.  We briefly discussed the role of surgery but she is not really interested in surgical intervention.  She will let me know if any changes or worsening of her symptoms.

## 2022-03-02 ENCOUNTER — TRANSCRIPTION ENCOUNTER (OUTPATIENT)
Age: 77
End: 2022-03-02

## 2022-03-17 ENCOUNTER — NON-APPOINTMENT (OUTPATIENT)
Age: 77
End: 2022-03-17

## 2022-03-17 LAB
25(OH)D3 SERPL-MCNC: 80.8 NG/ML
ALBUMIN SERPL ELPH-MCNC: 4.5 G/DL
ALP BLD-CCNC: 62 U/L
ALT SERPL-CCNC: 16 U/L
ANION GAP SERPL CALC-SCNC: 13 MMOL/L
AST SERPL-CCNC: 19 U/L
BILIRUB DIRECT SERPL-MCNC: 0.2 MG/DL
BILIRUB INDIRECT SERPL-MCNC: 0.5 MG/DL
BILIRUB SERPL-MCNC: 0.7 MG/DL
BUN SERPL-MCNC: 24 MG/DL
CALCIUM SERPL-MCNC: 10 MG/DL
CHLORIDE SERPL-SCNC: 105 MMOL/L
CHOLEST SERPL-MCNC: 121 MG/DL
CO2 SERPL-SCNC: 25 MMOL/L
CREAT SERPL-MCNC: 0.76 MG/DL
EGFR: 80 ML/MIN/1.73M2
ESTIMATED AVERAGE GLUCOSE: 120 MG/DL
FRUCTOSAMINE SERPL-MCNC: 223 UMOL/L
GLUCOSE BS SERPL-MCNC: 91 MG/DL
GLUCOSE SERPL-MCNC: 101 MG/DL
HBA1C MFR BLD HPLC: 5.8 %
HDLC SERPL-MCNC: 65 MG/DL
LDLC SERPL CALC-MCNC: 40 MG/DL
NONHDLC SERPL-MCNC: 56 MG/DL
POTASSIUM SERPL-SCNC: 5.1 MMOL/L
PROT SERPL-MCNC: 6.9 G/DL
SODIUM SERPL-SCNC: 144 MMOL/L
T4 FREE SERPL-MCNC: 1.6 NG/DL
TRIGL SERPL-MCNC: 80 MG/DL
TSH SERPL-ACNC: 0.78 UIU/ML

## 2022-03-22 ENCOUNTER — APPOINTMENT (OUTPATIENT)
Dept: ENDOCRINOLOGY | Facility: CLINIC | Age: 77
End: 2022-03-22
Payer: MEDICARE

## 2022-03-22 VITALS
TEMPERATURE: 98.2 F | OXYGEN SATURATION: 96 % | WEIGHT: 246 LBS | HEIGHT: 60 IN | HEART RATE: 76 BPM | SYSTOLIC BLOOD PRESSURE: 125 MMHG | RESPIRATION RATE: 16 BRPM | DIASTOLIC BLOOD PRESSURE: 69 MMHG | BODY MASS INDEX: 48.29 KG/M2

## 2022-03-22 LAB — GLUCOSE BLDC GLUCOMTR-MCNC: 100

## 2022-03-22 PROCEDURE — 82962 GLUCOSE BLOOD TEST: CPT

## 2022-03-22 PROCEDURE — 99214 OFFICE O/P EST MOD 30 MIN: CPT | Mod: 25

## 2022-03-22 NOTE — REASON FOR VISIT
[Follow - Up] : a follow-up visit [Adrenal Evaluation/Adrenal Disorder] : adrenal evaluation/adrenal disorder [DM Type 2] : DM Type 2 [Osteoporosis] : osteoporosis [Other___] : [unfilled]

## 2022-03-22 NOTE — DATA REVIEWED
[FreeTextEntry1] : The FBS and hbA1c indicates good diabetic control. The lipid panel is fine. The electrolytes are normal. She has osteopenia and she due for a new DEXA scan on 8/22. The TSH and Free t4 are normal. The US thyroid on 6/21 revealed the thyroid nodules unchanged.

## 2022-03-22 NOTE — HISTORY OF PRESENT ILLNESS
[FreeTextEntry1] : Patient feels well , but she had a fall, she is now on PT. Her weight has not changed. She is exercising regularly and following the diet. Her blood glucose at home are well controlled, they are usually around 110 mg/dl. The FBS in the laboratory was _ mg/dl and the HbA1c was 5.8 %. The renal function is within normal limits, the microalbumin in the urine was normal. The lipid panel was within normal limits. She denies low blood glucose during the night. She denies chest pain, or SOB. Denies numbness, tingling or burning sensation on her extremities. Taking her medications regularly. She has seen the Ophthalmologist recently. She has seen Podiatrist recently. She has not seen the Cardiologist recently. The MRI of the abdomen in 2019 revealed small adrenal adrenal adenoma 1.1 cm unchanged for several years. For several reasons she has never been work up to see if the lesion is functional.\par

## 2022-03-22 NOTE — ASSESSMENT
[FreeTextEntry1] : The diabetes remains well controlled\par She is clinically euthyroid\par The thyroid nodules are stable\par Will order a work up for the adrenal lesion\par Will repeat the MRI of the abdomen\par Advised to see Dr. Phillips and the GI doctor\par

## 2022-05-19 ENCOUNTER — TRANSCRIPTION ENCOUNTER (OUTPATIENT)
Age: 77
End: 2022-05-19

## 2022-06-20 ENCOUNTER — TRANSCRIPTION ENCOUNTER (OUTPATIENT)
Age: 77
End: 2022-06-20

## 2022-06-21 ENCOUNTER — TRANSCRIPTION ENCOUNTER (OUTPATIENT)
Age: 77
End: 2022-06-21

## 2022-07-01 ENCOUNTER — APPOINTMENT (OUTPATIENT)
Dept: INTERNAL MEDICINE | Facility: CLINIC | Age: 77
End: 2022-07-01

## 2022-07-01 VITALS
SYSTOLIC BLOOD PRESSURE: 148 MMHG | DIASTOLIC BLOOD PRESSURE: 65 MMHG | TEMPERATURE: 96.3 F | HEART RATE: 66 BPM | OXYGEN SATURATION: 95 % | BODY MASS INDEX: 48.49 KG/M2 | RESPIRATION RATE: 16 BRPM | WEIGHT: 247 LBS | HEIGHT: 60 IN

## 2022-07-01 PROCEDURE — 99214 OFFICE O/P EST MOD 30 MIN: CPT

## 2022-07-04 NOTE — HISTORY OF PRESENT ILLNESS
[FreeTextEntry8] : 76yoF PMH DM, HTN, hypothyroidism presents with few weeks b/l ankle swelling\par \par denies chest pain, sob, palpitation\par ankle swelling has nearly resolved after discontinuing amlodipine\par on amlodipine, nsaid and gabapentin\par states completed CARON and venous insufficiency exams with outside podiatry  - will fax reports\par \par sciatica improved with PT

## 2022-07-04 NOTE — PHYSICAL EXAM
[No Acute Distress] : no acute distress [Well Nourished] : well nourished [Well Developed] : well developed [Well-Appearing] : well-appearing [Normal Sclera/Conjunctiva] : normal sclera/conjunctiva [PERRL] : pupils equal round and reactive to light [EOMI] : extraocular movements intact [Normal Outer Ear/Nose] : the outer ears and nose were normal in appearance [Normal Oropharynx] : the oropharynx was normal [No JVD] : no jugular venous distention [No Lymphadenopathy] : no lymphadenopathy [Supple] : supple [Thyroid Normal, No Nodules] : the thyroid was normal and there were no nodules present [No Respiratory Distress] : no respiratory distress  [No Accessory Muscle Use] : no accessory muscle use [Clear to Auscultation] : lungs were clear to auscultation bilaterally [Normal Rate] : normal rate  [Regular Rhythm] : with a regular rhythm [Normal S1, S2] : normal S1 and S2 [No Murmur] : no murmur heard [No Carotid Bruits] : no carotid bruits [No Abdominal Bruit] : a ~M bruit was not heard ~T in the abdomen [No Varicosities] : no varicosities [Pedal Pulses Present] : the pedal pulses are present [No Palpable Aorta] : no palpable aorta [No Extremity Clubbing/Cyanosis] : no extremity clubbing/cyanosis [Soft] : abdomen soft [Non Tender] : non-tender [Non-distended] : non-distended [No Masses] : no abdominal mass palpated [No HSM] : no HSM [Normal Bowel Sounds] : normal bowel sounds [Normal Posterior Cervical Nodes] : no posterior cervical lymphadenopathy [Normal Anterior Cervical Nodes] : no anterior cervical lymphadenopathy [No CVA Tenderness] : no CVA  tenderness [No Spinal Tenderness] : no spinal tenderness [No Joint Swelling] : no joint swelling [Grossly Normal Strength/Tone] : grossly normal strength/tone [No Rash] : no rash [Coordination Grossly Intact] : coordination grossly intact [No Focal Deficits] : no focal deficits [Normal Gait] : normal gait [Deep Tendon Reflexes (DTR)] : deep tendon reflexes were 2+ and symmetric [Normal Affect] : the affect was normal [Normal Insight/Judgement] : insight and judgment were intact [de-identified] : nonpitting b/l feet swelling, negative Mary's, no calf tenderness

## 2022-07-11 ENCOUNTER — OUTPATIENT (OUTPATIENT)
Dept: OUTPATIENT SERVICES | Facility: HOSPITAL | Age: 77
LOS: 1 days | End: 2022-07-11
Payer: MEDICARE

## 2022-07-11 ENCOUNTER — APPOINTMENT (OUTPATIENT)
Dept: MRI IMAGING | Facility: HOSPITAL | Age: 77
End: 2022-07-11

## 2022-07-11 DIAGNOSIS — Z90.89 ACQUIRED ABSENCE OF OTHER ORGANS: Chronic | ICD-10-CM

## 2022-07-11 DIAGNOSIS — Z98.890 OTHER SPECIFIED POSTPROCEDURAL STATES: Chronic | ICD-10-CM

## 2022-07-11 DIAGNOSIS — H26.9 UNSPECIFIED CATARACT: Chronic | ICD-10-CM

## 2022-07-11 DIAGNOSIS — E11.9 TYPE 2 DIABETES MELLITUS WITHOUT COMPLICATIONS: ICD-10-CM

## 2022-07-11 PROCEDURE — G1004: CPT

## 2022-07-11 PROCEDURE — 74183 MRI ABD W/O CNTR FLWD CNTR: CPT | Mod: ME

## 2022-07-11 PROCEDURE — 74183 MRI ABD W/O CNTR FLWD CNTR: CPT | Mod: 26,ME

## 2022-07-11 PROCEDURE — A9585: CPT

## 2022-07-14 ENCOUNTER — OUTPATIENT (OUTPATIENT)
Dept: OUTPATIENT SERVICES | Facility: HOSPITAL | Age: 77
LOS: 1 days | End: 2022-07-14
Payer: MEDICARE

## 2022-07-14 ENCOUNTER — APPOINTMENT (OUTPATIENT)
Dept: MRI IMAGING | Facility: HOSPITAL | Age: 77
End: 2022-07-14

## 2022-07-14 DIAGNOSIS — R93.89 ABNORMAL FINDINGS ON DIAGNOSTIC IMAGING OF OTHER SPECIFIED BODY STRUCTURES: ICD-10-CM

## 2022-07-14 DIAGNOSIS — Z90.89 ACQUIRED ABSENCE OF OTHER ORGANS: Chronic | ICD-10-CM

## 2022-07-14 DIAGNOSIS — H26.9 UNSPECIFIED CATARACT: Chronic | ICD-10-CM

## 2022-07-14 DIAGNOSIS — Z98.890 OTHER SPECIFIED POSTPROCEDURAL STATES: Chronic | ICD-10-CM

## 2022-07-14 PROCEDURE — 73720 MRI LWR EXTREMITY W/O&W/DYE: CPT | Mod: 26,LT,MH

## 2022-07-14 PROCEDURE — A9585: CPT

## 2022-07-14 PROCEDURE — 73720 MRI LWR EXTREMITY W/O&W/DYE: CPT

## 2022-07-20 ENCOUNTER — TRANSCRIPTION ENCOUNTER (OUTPATIENT)
Age: 77
End: 2022-07-20

## 2022-07-21 ENCOUNTER — TRANSCRIPTION ENCOUNTER (OUTPATIENT)
Age: 77
End: 2022-07-21

## 2022-07-25 ENCOUNTER — APPOINTMENT (OUTPATIENT)
Dept: INTERNAL MEDICINE | Facility: CLINIC | Age: 77
End: 2022-07-25

## 2022-07-25 VITALS
OXYGEN SATURATION: 95 % | BODY MASS INDEX: 46.44 KG/M2 | DIASTOLIC BLOOD PRESSURE: 71 MMHG | WEIGHT: 246 LBS | SYSTOLIC BLOOD PRESSURE: 149 MMHG | TEMPERATURE: 97.2 F | HEART RATE: 72 BPM | RESPIRATION RATE: 16 BRPM | HEIGHT: 61 IN

## 2022-07-25 DIAGNOSIS — T73.3XXA EXHAUSTION DUE TO EXCESSIVE EXERTION, INITIAL ENCOUNTER: ICD-10-CM

## 2022-07-25 PROCEDURE — 99214 OFFICE O/P EST MOD 30 MIN: CPT

## 2022-07-25 RX ORDER — AMLODIPINE BESYLATE 2.5 MG/1
2.5 TABLET ORAL
Qty: 90 | Refills: 3 | Status: DISCONTINUED | COMMUNITY
End: 2022-07-25

## 2022-07-25 RX ORDER — AMLODIPINE BESYLATE 5 MG/1
5 TABLET ORAL
Qty: 90 | Refills: 0 | Status: DISCONTINUED | COMMUNITY
Start: 2022-06-17

## 2022-07-27 PROBLEM — T73.3XXA FATIGUE DUE TO EXCESSIVE EXERTION: Status: ACTIVE | Noted: 2022-07-25

## 2022-07-27 NOTE — PHYSICAL EXAM
[No Acute Distress] : no acute distress [Well Nourished] : well nourished [Well Developed] : well developed [Well-Appearing] : well-appearing [Normal Sclera/Conjunctiva] : normal sclera/conjunctiva [PERRL] : pupils equal round and reactive to light [EOMI] : extraocular movements intact [Normal Outer Ear/Nose] : the outer ears and nose were normal in appearance [Normal Oropharynx] : the oropharynx was normal [No JVD] : no jugular venous distention [No Lymphadenopathy] : no lymphadenopathy [Supple] : supple [Thyroid Normal, No Nodules] : the thyroid was normal and there were no nodules present [No Respiratory Distress] : no respiratory distress  [No Accessory Muscle Use] : no accessory muscle use [Clear to Auscultation] : lungs were clear to auscultation bilaterally [Normal Rate] : normal rate  [Regular Rhythm] : with a regular rhythm [Normal S1, S2] : normal S1 and S2 [No Murmur] : no murmur heard [No Carotid Bruits] : no carotid bruits [No Abdominal Bruit] : a ~M bruit was not heard ~T in the abdomen [No Varicosities] : no varicosities [Pedal Pulses Present] : the pedal pulses are present [No Palpable Aorta] : no palpable aorta [No Extremity Clubbing/Cyanosis] : no extremity clubbing/cyanosis [Soft] : abdomen soft [Non Tender] : non-tender [Non-distended] : non-distended [No Masses] : no abdominal mass palpated [No HSM] : no HSM [Normal Bowel Sounds] : normal bowel sounds [Normal Posterior Cervical Nodes] : no posterior cervical lymphadenopathy [Normal Anterior Cervical Nodes] : no anterior cervical lymphadenopathy [No CVA Tenderness] : no CVA  tenderness [No Spinal Tenderness] : no spinal tenderness [No Joint Swelling] : no joint swelling [Grossly Normal Strength/Tone] : grossly normal strength/tone [No Rash] : no rash [Coordination Grossly Intact] : coordination grossly intact [No Focal Deficits] : no focal deficits [Normal Gait] : normal gait [Deep Tendon Reflexes (DTR)] : deep tendon reflexes were 2+ and symmetric [Normal Affect] : the affect was normal [Normal Insight/Judgement] : insight and judgment were intact [de-identified] : minimal nonpitting swelling dorsum left foot

## 2022-07-27 NOTE — HISTORY OF PRESENT ILLNESS
[FreeTextEntry1] : foot swelling [de-identified] : 76yoF PMH DM2, HTN, hypothyroidism, adrenal adenoma (stable), left gluteal lipoma presents for follow up ankle swelling\par \par swelling minimal now after amlodipine decreased\par unable to discontinue nsaid and gabapentin\par \par endorses fatigue with exertion\par has not seen cardiology for years\par denies chest pain, sob

## 2022-07-29 ENCOUNTER — TRANSCRIPTION ENCOUNTER (OUTPATIENT)
Age: 77
End: 2022-07-29

## 2022-08-02 ENCOUNTER — APPOINTMENT (OUTPATIENT)
Dept: SURGICAL ONCOLOGY | Facility: CLINIC | Age: 77
End: 2022-08-02

## 2022-08-02 VITALS
SYSTOLIC BLOOD PRESSURE: 151 MMHG | WEIGHT: 246 LBS | DIASTOLIC BLOOD PRESSURE: 79 MMHG | HEART RATE: 72 BPM | HEIGHT: 61 IN | BODY MASS INDEX: 46.44 KG/M2

## 2022-08-02 DIAGNOSIS — R93.89 ABNORMAL FINDINGS ON DIAGNOSTIC IMAGING OF OTHER SPECIFIED BODY STRUCTURES: ICD-10-CM

## 2022-08-02 PROCEDURE — 99214 OFFICE O/P EST MOD 30 MIN: CPT

## 2022-08-02 NOTE — ASSESSMENT
[FreeTextEntry1] : IMP: \par \par Left gluteal spindle cell lipoma\par Enhancing soft tissue tumor in the left gluteus medius muscle. No interval change in size or appearance compared to the previous MRI.\par \par MRI 7/2022 - no interval change in size or appearance \par \par Plan:\par Observation only at this point\par RTO yearly with MRI\par \par

## 2022-08-02 NOTE — PHYSICAL EXAM
[Normal] : supple, no neck mass and thyroid not enlarged [Normal Neck Lymph Nodes] : normal neck lymph nodes  [Normal Supraclavicular Lymph Nodes] : normal supraclavicular lymph nodes [Normal Groin Lymph Nodes] : normal groin lymph nodes [Normal] : oriented to person, place and time, with appropriate affect [de-identified] : No palpable mass in the left buttock [de-identified] : no neurologic deficit in either leg

## 2022-08-02 NOTE — HISTORY OF PRESENT ILLNESS
[de-identified] : Ms. EMMANUEL VEGA is a 76 year old woman who returns today for ongoing follow-up\par \par She had initially presented in April 2019  for an abdominal MRI for surveillance of an adrenal adenoma. Study demonstrated a stable 1.1 cm left adrenal adenoma, benign-appearing liver lesion and an incidental 4.5 cm mass in the left gluteal region. As such, she was referred for an MRI of the left hip which revealed a 4.8 cm soft tissue lesion with central necrosis interposed between the left gluteus medius and minimus muscles.  Patient reported no symptoms other than intermittent chronic back and sciatic type pain. Patient is s/p CT guided percutaneous needle biopsy of left gluteal mass on 05/06/2019 by IR. 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 \par \par \par We discussed surgical excision, however, she opted for ongoing MRI surveillance.  Most recent MRI in January 2020 re demonstrated the mass which is decreased in size from prior exam, currently measuring 4.1 cm. \par \par She underwent US guided FNA of a right thyroid lobe nodule 1/21/20 with Dr. Shiva Montoya. \par \par Surveillance MRI in July 2021 demonstrated enhancing soft tissue tumor in the left gluteus medius muscle. No interval change in size or appearance compared to the previous MRI.\par \par Surveillance MRI from 7/2022, no interval change to size or appearance.\par \par Her past medical history includes HTN, DM 2, hypothyroidism, PE in 2014 (felt secondary to prolonged medroxyprogesterone use for dysfunctional uterine bleeding) she was on Coumadin for 3 years but was advised to stop per her hematologist. \par She is feeling well and denies any symptoms related to the mass. \par

## 2022-08-02 NOTE — CONSULT LETTER
[Dear  ___] : Dear  [unfilled], [Courtesy Letter:] : I had the pleasure of seeing your patient, [unfilled], in my office today. [Please see my note below.] : Please see my note below. [Consult Closing:] : Thank you very much for allowing me to participate in the care of this patient.  If you have any questions, please do not hesitate to contact me. [Sincerely,] : Sincerely, [FreeTextEntry1] : i will keep you informed of my annual follow-up. [FreeTextEntry3] : Jean Phillips MD FACS\par Chief of Surgical Oncology\par \par

## 2022-08-09 ENCOUNTER — APPOINTMENT (OUTPATIENT)
Dept: ENDOCRINOLOGY | Facility: CLINIC | Age: 77
End: 2022-08-09

## 2022-08-09 VITALS
BODY MASS INDEX: 45.69 KG/M2 | TEMPERATURE: 96.7 F | HEIGHT: 61 IN | RESPIRATION RATE: 16 BRPM | HEART RATE: 70 BPM | OXYGEN SATURATION: 97 % | DIASTOLIC BLOOD PRESSURE: 82 MMHG | SYSTOLIC BLOOD PRESSURE: 153 MMHG | WEIGHT: 242 LBS

## 2022-08-09 LAB — GLUCOSE BLDC GLUCOMTR-MCNC: 94

## 2022-08-09 PROCEDURE — 82962 GLUCOSE BLOOD TEST: CPT

## 2022-08-09 PROCEDURE — 99214 OFFICE O/P EST MOD 30 MIN: CPT

## 2022-08-09 NOTE — REASON FOR VISIT
[Follow - Up] : a follow-up visit [Adrenal Evaluation/Adrenal Disorder] : adrenal evaluation/adrenal disorder [DM Type 2] : DM Type 2 [Other___] : [unfilled]

## 2022-08-09 NOTE — PHYSICAL EXAM
[Alert] : alert [Obese] : obese [No Acute Distress] : no acute distress [No Neck Mass] : no neck mass was observed [Thyroid Not Enlarged] : the thyroid was not enlarged [No Respiratory Distress] : no respiratory distress [Clear to Auscultation] : lungs were clear to auscultation bilaterally [Normal PMI] : the apical impulse was normal [Normal Rate] : heart rate was normal

## 2022-08-09 NOTE — HISTORY OF PRESENT ILLNESS
[FreeTextEntry1] : Patient feels well , she is asymptomatic. Her weight has not changed. She is exercising regularly and following the diet. Her blood glucose at home are well controlled. She denies low blood glucose during the night. She denies chest pain, or SOB. Denies numbness, tingling or burning sensation on her extremities. Taking her medications regularly. She did the work up for Cushing's disease\par

## 2022-08-09 NOTE — DATA REVIEWED
[FreeTextEntry1] : The Overnight Dexamethasone Suppression tests was negative. The Salivary cortisol was fine.

## 2022-08-09 NOTE — ASSESSMENT
[FreeTextEntry1] : Her diabetes is well controlled\par The patient has a stable adrenal adenoma\par The work up for Cushing's disease was negative.\par Will continue the same treatment

## 2022-08-30 ENCOUNTER — EMERGENCY (EMERGENCY)
Facility: HOSPITAL | Age: 77
LOS: 1 days | Discharge: HOME | End: 2022-08-30
Attending: STUDENT IN AN ORGANIZED HEALTH CARE EDUCATION/TRAINING PROGRAM
Payer: MEDICARE

## 2022-08-30 VITALS
TEMPERATURE: 98 F | RESPIRATION RATE: 18 BRPM | HEART RATE: 63 BPM | DIASTOLIC BLOOD PRESSURE: 75 MMHG | OXYGEN SATURATION: 98 % | SYSTOLIC BLOOD PRESSURE: 149 MMHG

## 2022-08-30 VITALS
HEART RATE: 69 BPM | TEMPERATURE: 98 F | SYSTOLIC BLOOD PRESSURE: 139 MMHG | HEIGHT: 60 IN | OXYGEN SATURATION: 95 % | WEIGHT: 240.08 LBS | RESPIRATION RATE: 17 BRPM | DIASTOLIC BLOOD PRESSURE: 79 MMHG

## 2022-08-30 DIAGNOSIS — H26.9 UNSPECIFIED CATARACT: Chronic | ICD-10-CM

## 2022-08-30 DIAGNOSIS — Z90.89 ACQUIRED ABSENCE OF OTHER ORGANS: Chronic | ICD-10-CM

## 2022-08-30 DIAGNOSIS — Z98.890 OTHER SPECIFIED POSTPROCEDURAL STATES: Chronic | ICD-10-CM

## 2022-08-30 LAB
ALBUMIN SERPL ELPH-MCNC: 3.6 G/DL — SIGNIFICANT CHANGE UP (ref 3.5–5)
ALP SERPL-CCNC: 52 U/L — SIGNIFICANT CHANGE UP (ref 40–120)
ALT FLD-CCNC: 21 U/L DA — SIGNIFICANT CHANGE UP (ref 10–60)
ANION GAP SERPL CALC-SCNC: 4 MMOL/L — LOW (ref 5–17)
APPEARANCE UR: CLEAR — SIGNIFICANT CHANGE UP
APTT BLD: 41 SEC — HIGH (ref 27.5–35.5)
AST SERPL-CCNC: 17 U/L — SIGNIFICANT CHANGE UP (ref 10–40)
BACTERIA # UR AUTO: ABNORMAL /HPF
BASOPHILS # BLD AUTO: 0.02 K/UL — SIGNIFICANT CHANGE UP (ref 0–0.2)
BASOPHILS NFR BLD AUTO: 0.3 % — SIGNIFICANT CHANGE UP (ref 0–2)
BILIRUB SERPL-MCNC: 0.7 MG/DL — SIGNIFICANT CHANGE UP (ref 0.2–1.2)
BILIRUB UR-MCNC: NEGATIVE — SIGNIFICANT CHANGE UP
BUN SERPL-MCNC: 19 MG/DL — HIGH (ref 7–18)
CALCIUM SERPL-MCNC: 9.1 MG/DL — SIGNIFICANT CHANGE UP (ref 8.4–10.5)
CHLORIDE SERPL-SCNC: 110 MMOL/L — HIGH (ref 96–108)
CO2 SERPL-SCNC: 30 MMOL/L — SIGNIFICANT CHANGE UP (ref 22–31)
COLOR SPEC: YELLOW — SIGNIFICANT CHANGE UP
COMMENT - URINE: SIGNIFICANT CHANGE UP
CREAT SERPL-MCNC: 0.72 MG/DL — SIGNIFICANT CHANGE UP (ref 0.5–1.3)
DIFF PNL FLD: NEGATIVE — SIGNIFICANT CHANGE UP
EGFR: 86 ML/MIN/1.73M2 — SIGNIFICANT CHANGE UP
EOSINOPHIL # BLD AUTO: 0.08 K/UL — SIGNIFICANT CHANGE UP (ref 0–0.5)
EOSINOPHIL NFR BLD AUTO: 1.3 % — SIGNIFICANT CHANGE UP (ref 0–6)
EPI CELLS # UR: ABNORMAL /HPF
GLUCOSE SERPL-MCNC: 105 MG/DL — HIGH (ref 70–99)
GLUCOSE UR QL: NEGATIVE — SIGNIFICANT CHANGE UP
HCT VFR BLD CALC: 42.6 % — SIGNIFICANT CHANGE UP (ref 34.5–45)
HGB BLD-MCNC: 13.9 G/DL — SIGNIFICANT CHANGE UP (ref 11.5–15.5)
IMM GRANULOCYTES NFR BLD AUTO: 0.2 % — SIGNIFICANT CHANGE UP (ref 0–1.5)
INR BLD: 1.11 RATIO — SIGNIFICANT CHANGE UP (ref 0.88–1.16)
KETONES UR-MCNC: NEGATIVE — SIGNIFICANT CHANGE UP
LEUKOCYTE ESTERASE UR-ACNC: ABNORMAL
LYMPHOCYTES # BLD AUTO: 1.47 K/UL — SIGNIFICANT CHANGE UP (ref 1–3.3)
LYMPHOCYTES # BLD AUTO: 24.7 % — SIGNIFICANT CHANGE UP (ref 13–44)
MCHC RBC-ENTMCNC: 30.3 PG — SIGNIFICANT CHANGE UP (ref 27–34)
MCHC RBC-ENTMCNC: 32.6 GM/DL — SIGNIFICANT CHANGE UP (ref 32–36)
MCV RBC AUTO: 92.8 FL — SIGNIFICANT CHANGE UP (ref 80–100)
MONOCYTES # BLD AUTO: 0.29 K/UL — SIGNIFICANT CHANGE UP (ref 0–0.9)
MONOCYTES NFR BLD AUTO: 4.9 % — SIGNIFICANT CHANGE UP (ref 2–14)
NEUTROPHILS # BLD AUTO: 4.09 K/UL — SIGNIFICANT CHANGE UP (ref 1.8–7.4)
NEUTROPHILS NFR BLD AUTO: 68.6 % — SIGNIFICANT CHANGE UP (ref 43–77)
NITRITE UR-MCNC: NEGATIVE — SIGNIFICANT CHANGE UP
NRBC # BLD: 0 /100 WBCS — SIGNIFICANT CHANGE UP (ref 0–0)
NT-PROBNP SERPL-SCNC: 271 PG/ML — SIGNIFICANT CHANGE UP (ref 0–450)
PH UR: 6 — SIGNIFICANT CHANGE UP (ref 5–8)
PLATELET # BLD AUTO: 193 K/UL — SIGNIFICANT CHANGE UP (ref 150–400)
POTASSIUM SERPL-MCNC: 4.3 MMOL/L — SIGNIFICANT CHANGE UP (ref 3.5–5.3)
POTASSIUM SERPL-SCNC: 4.3 MMOL/L — SIGNIFICANT CHANGE UP (ref 3.5–5.3)
PROT SERPL-MCNC: 7 G/DL — SIGNIFICANT CHANGE UP (ref 6–8.3)
PROT UR-MCNC: NEGATIVE — SIGNIFICANT CHANGE UP
PROTHROM AB SERPL-ACNC: 13.2 SEC — SIGNIFICANT CHANGE UP (ref 10.5–13.4)
RAPID RVP RESULT: SIGNIFICANT CHANGE UP
RBC # BLD: 4.59 M/UL — SIGNIFICANT CHANGE UP (ref 3.8–5.2)
RBC # FLD: 12.8 % — SIGNIFICANT CHANGE UP (ref 10.3–14.5)
RBC CASTS # UR COMP ASSIST: SIGNIFICANT CHANGE UP /HPF (ref 0–2)
SARS-COV-2 RNA SPEC QL NAA+PROBE: SIGNIFICANT CHANGE UP
SODIUM SERPL-SCNC: 144 MMOL/L — SIGNIFICANT CHANGE UP (ref 135–145)
SP GR SPEC: 1 — LOW (ref 1.01–1.02)
TROPONIN I, HIGH SENSITIVITY RESULT: 8.4 NG/L — SIGNIFICANT CHANGE UP
UROBILINOGEN FLD QL: NEGATIVE — SIGNIFICANT CHANGE UP
WBC # BLD: 5.96 K/UL — SIGNIFICANT CHANGE UP (ref 3.8–10.5)
WBC # FLD AUTO: 5.96 K/UL — SIGNIFICANT CHANGE UP (ref 3.8–10.5)
WBC UR QL: SIGNIFICANT CHANGE UP /HPF (ref 0–5)

## 2022-08-30 PROCEDURE — 99284 EMERGENCY DEPT VISIT MOD MDM: CPT

## 2022-08-30 PROCEDURE — 87086 URINE CULTURE/COLONY COUNT: CPT

## 2022-08-30 PROCEDURE — 71045 X-RAY EXAM CHEST 1 VIEW: CPT

## 2022-08-30 PROCEDURE — 71045 X-RAY EXAM CHEST 1 VIEW: CPT | Mod: 26

## 2022-08-30 PROCEDURE — 36415 COLL VENOUS BLD VENIPUNCTURE: CPT

## 2022-08-30 PROCEDURE — 85610 PROTHROMBIN TIME: CPT

## 2022-08-30 PROCEDURE — 84484 ASSAY OF TROPONIN QUANT: CPT

## 2022-08-30 PROCEDURE — 0225U NFCT DS DNA&RNA 21 SARSCOV2: CPT

## 2022-08-30 PROCEDURE — 80053 COMPREHEN METABOLIC PANEL: CPT

## 2022-08-30 PROCEDURE — 85025 COMPLETE CBC W/AUTO DIFF WBC: CPT

## 2022-08-30 PROCEDURE — 83880 ASSAY OF NATRIURETIC PEPTIDE: CPT

## 2022-08-30 PROCEDURE — 85730 THROMBOPLASTIN TIME PARTIAL: CPT

## 2022-08-30 PROCEDURE — 81001 URINALYSIS AUTO W/SCOPE: CPT

## 2022-08-30 PROCEDURE — 99283 EMERGENCY DEPT VISIT LOW MDM: CPT | Mod: 25

## 2022-08-30 RX ORDER — SODIUM CHLORIDE 9 MG/ML
500 INJECTION INTRAMUSCULAR; INTRAVENOUS; SUBCUTANEOUS ONCE
Refills: 0 | Status: DISCONTINUED | OUTPATIENT
Start: 2022-08-30 | End: 2022-09-02

## 2022-08-30 NOTE — ED PROVIDER NOTE - PATIENT PORTAL LINK FT
You can access the FollowMyHealth Patient Portal offered by [facility] by registering at the following website: [email or web address]. By joining [facility]’s FollowMyHealth portal, you will also be able to view your health information using other applications (apps) compatible with our system.

## 2022-08-30 NOTE — ED PROVIDER NOTE - OBJECTIVE STATEMENT
76 year old female with PMH HTN, DM, HLD presents with chest pain since this morning. Pt reports intermittent left sided CP radiating to left upper back since this morning lasting "a few minutes" when present. Pt reports associated fatigue, tremulousness, and mild headache. Denies any fevers, shortness of breath, abdominal pain, vomiting, diarrhea, bloody stools, black tarry stools, dysuria, headache, vision change, numbness, weakness, saddle anesthesia, or rash. Denies any ill contacts. Denies any recent injury or trauma. Denies any additional complaints. 76 year old female with PMH HTN, DM, HLD presents with chest pain since this morning. Pt reports intermittent left sided CP radiating to left upper back since this morning lasting "a few seconds" when present. Pt reports associated fatigue, tremulousness, and mild headache. Denies any fevers, shortness of breath, abdominal pain, vomiting, diarrhea, bloody stools, black tarry stools, dysuria, headache, vision change, numbness, weakness, saddle anesthesia, or rash. Denies any ill contacts. Denies any recent injury or trauma. Denies any complaints at this time and states she feels well. Denies any additional complaints.

## 2022-08-30 NOTE — ED PROVIDER NOTE - NS ED ROS FT
Review of Systems    Constitutional: (-) fever, (-) chills, (+) fatigue  HEENT: (-) sore throat, (-) hearing loss, (-) nasal congestion  Cardiovascular: (+) chest pain, (-) syncope  Respiratory: (-) cough, (-) shortness of breath  Gastrointestinal: (-) vomiting, (-) diarrhea, (-) abdominal pain  Musculoskeletal: (-) neck pain, (+) back pain, (-) joint pain  Integumentary: (-) rash, (-) edema, (-) wound  Neurological: (+) headache, (-) altered mental status    Except as documented in the HPI, all other systems are negative.

## 2022-08-30 NOTE — ED PROVIDER NOTE - PROGRESS NOTE DETAILS
Dann-: pt seen and re-evaluated at bedside.  Pt states her symptoms have improved.  Pt comfortable in NAD.  Offered admission, pt declines, states she will follow up with her PMD. Discussed return precautions, pt understood and agreeable with plan.

## 2022-08-30 NOTE — ED PROVIDER NOTE - CLINICAL SUMMARY MEDICAL DECISION MAKING FREE TEXT BOX
Ken: 76 year old female with PMH HTN, DM, HLD presents with chest pain since this morning. Pt reports intermittent left sided CP radiating to left upper back since this morning lasting "a few seconds" when present. Pt reports associated fatigue, tremulousness, and mild headache. Denies any fevers, shortness of breath, abdominal pain, vomiting, diarrhea, bloody stools, black tarry stools, dysuria, headache, vision change, numbness, weakness, saddle anesthesia, or rash. Denies any ill contacts. Denies any recent injury or trauma. Denies any complaints at this time and states she feels well. History not consistent with ACS as pain lasting "seconds," no pleuritic chest pain or calf pain to suggest PE. Vital signs non-actionable. Pt well appearing. Will obtain labs, imaging, supportive treatment with dispo pending workup.

## 2022-08-30 NOTE — ED PROVIDER NOTE - NSFOLLOWUPINSTRUCTIONS_ED_ALL_ED_FT
Rest and stay well hydrated.    Follow up with your primary care physician in 1-3 days.    Return immediately with any new or worsening symptoms including fevers, severe pain, chest pain, shortness of breath, abdominal pain, vomiting, diarrhea, bloody stools, black tarry stools, vision change, numbness, weakness, or any additional concerns

## 2022-08-30 NOTE — ED PROVIDER NOTE - DISPOSITION TYPE
[facility]   Intensive Care Unit Progress Note                                              Name: Celso Myers MRN# [id number]   Parents: Carissa Myers  and Roc Mclaughlin  Date/Time of Birth: 2021  7:42 PM  Date of Admission:   2019         History of Present Illness    4 lb 1.6 oz (1860 g), appropriate for gestational age, Gestational Age: 34w0d, female infant born by Code  for prolapsed cord.   Our team was asked by Dr. Kortney Cordova to care for this infant born at [facility]. Apgar scores 8/9    Celso was admitted to the NICU for further evaluation, monitoring and treatment of prematurity, and possible sepsis.  Patient Active Problem List   Diagnosis     Prematurity     Oxygen desaturation      hypoglycemia     Sacral dimple     Single liveborn infant, delivered by      Need for observation and evaluation of  for sepsis      hyperbilirubinemia     Cytomegalovirus infection (H)          Interval History   Few desats for which LFNC was started       Assessment & Plan   Overall Status:    9 day old,  , AGA female, now 35w2d PMA.     This patient whose weight is < 5000 grams is not critically ill. Patient requires cardiac/respiratory monitoring, vital sign monitoring, temperature maintenance, enteral feeding adjustments, lab and/or oxygen monitoring and continuous assessment by the health care team under direct physician supervision.    Vascular Access:    PIV stopped .       FEN:  Vitals:    11/15/19 0100 19 0000 19 0000   Weight: 1.872 kg (4 lb 2 oz) 1.874 kg (4 lb 2.1 oz) 1.883 kg (4 lb 2.4 oz)   1% Weight change: 0.009 kg (0.3 oz)1    I:132 ml/kg/d; 106 kcal/kg/d.    Malnutrition, Normoglycemia  - TF goal to 150 ml/kg/day.  - Enteral nutrition with MBM with sHMF/SSC24,  Now on full feeds . No dBM  -  Off sTPN and IL .   -  IDF with breast and bottle  started   - 15% PO  - Consult lactation specialist and dietician.    Resp:   No distress  - 1/ LFNC since  (started for desats ), stopped   - Routine CR monitoring with oximetry.    Apnea of Prematurity:    At low risk due to PMA =34 weeks.    - Some apnea, desats and Reginald spells continue, and PB noted.  6 spells , 3 just after feeds, 5 required TS, and then again on  , so Caffeine load given     CV:   Stable. Good perfusion and BP.    - Routine CR monitoring.   - Goal mBP > 34.     ID:   Potential for sepsis in the setting of positive GBS  IAP administered x 1 doses PTD.   - blood cultures on admission NGTD  - Stopped Ampicillin and gentamicin if Cx neg at 48 hours  - SIgM (16)   - Urine  is CMV positive @ 588 international unit(s)/mL or Log 2.8 (calcification in basal ganglia)  - Spoke with Jas Draper from [facility] ID on . He felt the urine titer was somewhat low and that further information would help determine whether treatment would be beneficial.   - AST 40, ALT 15, Alk ptase 297,     Recent Labs   Lab Test 19  0615 11/15/19  0340 191 19  0355 19  0550   BILITOTAL 3.8 5.9 8.0 8.1 10.0   DBIL 0.7* 0.6* 0.7* 0.5 0.3     - dbili needs f/u  - Hearing screen on  passed  - Spoke to both parents about this.   - Will get ophthalmology exam, LFT's, blood CMV PCR hearing test.    Hematology:   Risk for anemia of prematurity/phlebotomy.  - Monitor hemoglobin   - Fe at 2 weeks    Jaundice:   At risk for hyperbilirubinemia due to prematurity.   Maternal blood type O+/baby O+, QUIANA neg.    - Monitor bilirubin and hemoglobin. Consider phototherapy for bili based on AAP Nomogram.     Bilirubin results:  Recent Labs   Lab Test 19  0615 11/15/19  0340 19  0411 19  0355 19  0550   BILITOTAL 3.8 5.9 8.0 8.1 10.0   DBIL 0.7* 0.6* 0.7* 0.5 0.3   Phototherapy 11/10-    dbili needsf/u    CNS:  At low  risk for IVH/PVL  "due to GA =34 weeks.  Plan for screening head US at DOL 5-7: Echogenic foci in the basal ganglia on the left are nonspecific, possibly representing small calcifications, consider evaluation for torch infections. No intracranial hemorrhage and ~36wks CGA (eval for PVL).  - HC on admission 50th percentile  - Monitor clinical exam and weekly OFC measurements.      Sedation/Pain Management:   - Non-pharmacologic comfort measures.Sweet-ease for painful procedures.    Thermoregulation:  - Monitor temperature and provide thermal support as indicated.    HCM:  - [place] 24 hours of age.  - Send repeat NMS at 14 & 30 days old (BW < 2000).  - Obtain hearing passed /CCHD/carseat screens PTD.  - Continue standard NICU cares and family education plan.    Immunizations   - Give Hep B immunization at 21-30 days old (BW <2000 gm) or PTD, whichever comes first.        Medications   Current Facility-Administered Medications   Medication     Breast Milk label for barcode scanning 1 Bottle     cholecalciferol (D-VI-SOL, Vitamin D3) 10 MCG/ML (400 units/ml) liquid 400 Units     glycerin (PEDI-LAX) Suppository 0.25 suppository     [START ON 2019] hepatitis b vaccine recombinant (ENGERIX-B) injection 10 mcg     sucrose (SWEET-EASE) solution 0.2-2 mL          Physical Exam   Blood pressure 76/57, temperature 98  F (36.7  C), temperature source Axillary, resp. rate 50, height 0.432 m (1' 5.01\"), weight 1.883 kg (4 lb 2.4 oz), head circumference 30 cm (11.81\"), SpO2 96 %.  VSS, pink, well perfused, No dysmorphology, AF soft, sutures approximated, CHULA, neck supple, no masses, lungs clear, S1 and S2 without murmur, abdomen soft no masses, normal BS, normal  female genitalia, hips stable, tone and responsiveness GA appropriate, skin mild icterus           Communications   Parents:  Updated after rounds    PCPs:  Infant PCP: Physician No Ref-Primary  Maternal OB PCP:   Information for the patient's " mother:  Carissa Myers [[id number]]   No Ref-Primary, Physician    MFM:  Delivering Provider:   Dr. Kortney Cordova  Admission note routed to all.    Health Care Team:  Patient discussed with the care team. A/P, imaging studies, laboratory data, medications and family situation reviewed.    Female-Carissa Myers was seen and evaluated by me, Lexi Villalobos MD, MD.   I have reviewed data including history, medications, laboratory results and vital signs.     DISCHARGE

## 2022-08-30 NOTE — ED PROVIDER NOTE - PHYSICAL EXAMINATION
CONSTITUTIONAL: non-toxic, well appearing  SKIN: no rash, no petechiae.  EYES: pink conjunctiva, anicteric  ENT: tongue and uvular midline, no exudates, moist mucous membranes  NECK: Supple; no meningismus, no JVD  CARD: RRR, no murmurs, equal radial pulses bilaterally 2+  RESP: CTAB, no respiratory distress  ABD: Soft, non-tender, non-distended, no peritoneal signs, no CVA tenderness  EXT: Normal ROM x4. No edema. No calf tenderness  NEURO: Alert, oriented. Neuro exam nonfocal, equal strength bilaterally  PSYCH: Cooperative, appropriate.

## 2022-08-31 LAB
CULTURE RESULTS: SIGNIFICANT CHANGE UP
SPECIMEN SOURCE: SIGNIFICANT CHANGE UP

## 2022-09-19 ENCOUNTER — TRANSCRIPTION ENCOUNTER (OUTPATIENT)
Age: 77
End: 2022-09-19

## 2022-09-19 ENCOUNTER — APPOINTMENT (OUTPATIENT)
Dept: INTERNAL MEDICINE | Facility: CLINIC | Age: 77
End: 2022-09-19

## 2022-09-19 VITALS
SYSTOLIC BLOOD PRESSURE: 153 MMHG | WEIGHT: 244 LBS | HEART RATE: 69 BPM | RESPIRATION RATE: 16 BRPM | BODY MASS INDEX: 46.07 KG/M2 | TEMPERATURE: 97.9 F | OXYGEN SATURATION: 94 % | DIASTOLIC BLOOD PRESSURE: 75 MMHG | HEIGHT: 61 IN

## 2022-09-19 PROCEDURE — 99214 OFFICE O/P EST MOD 30 MIN: CPT

## 2022-09-19 RX ORDER — GABAPENTIN 300 MG/1
300 CAPSULE ORAL
Qty: 252 | Refills: 0 | Status: DISCONTINUED | COMMUNITY
Start: 2022-03-18

## 2022-09-19 RX ORDER — METOPROLOL SUCCINATE 25 MG/1
25 TABLET, EXTENDED RELEASE ORAL
Qty: 30 | Refills: 3 | Status: DISCONTINUED | COMMUNITY
Start: 2022-07-25 | End: 2022-09-19

## 2022-09-19 RX ORDER — DEXAMETHASONE 1 MG/1
1 TABLET ORAL
Qty: 1 | Refills: 1 | Status: COMPLETED | COMMUNITY
Start: 2022-03-22 | End: 2022-09-19

## 2022-09-20 ENCOUNTER — NON-APPOINTMENT (OUTPATIENT)
Age: 77
End: 2022-09-20

## 2022-09-21 NOTE — HISTORY OF PRESENT ILLNESS
[FreeTextEntry1] : sore throat [de-identified] : 76yoF PMH DM2, HTN, hypothyroidism, adrenal adenoma (stable), left gluteal lipoma presents for follow up ankle swelling\par \par swelling minimal now after amlodipine decreased\par unable to discontinue nsaid and gabapentin\par \par endorses fatigue with exertion\par has not seen cardiology for years\par denies chest pain, sob\par \par 22:\par \par 2-3 weeks ago, seen in ED for elevated blood pressure after   unexpectedly\par \par endorses sore throat after \par denies fever, chills, cough\par COVID negative\par

## 2022-10-21 ENCOUNTER — APPOINTMENT (OUTPATIENT)
Dept: INTERNAL MEDICINE | Facility: CLINIC | Age: 77
End: 2022-10-21

## 2022-10-21 VITALS
DIASTOLIC BLOOD PRESSURE: 77 MMHG | SYSTOLIC BLOOD PRESSURE: 156 MMHG | HEIGHT: 61 IN | BODY MASS INDEX: 46.63 KG/M2 | WEIGHT: 247 LBS | RESPIRATION RATE: 16 BRPM | HEART RATE: 66 BPM | OXYGEN SATURATION: 97 % | TEMPERATURE: 97.9 F

## 2022-10-21 PROCEDURE — 99214 OFFICE O/P EST MOD 30 MIN: CPT

## 2022-10-22 RX ORDER — NYSTATIN AND TRIAMCINOLONE ACETONIDE 100000; 1 MG/G; MG/G
100000-0.1 CREAM TOPICAL TWICE DAILY
Qty: 15 | Refills: 2 | Status: DISCONTINUED | COMMUNITY
Start: 2022-03-22 | End: 2022-10-22

## 2022-10-22 NOTE — HISTORY OF PRESENT ILLNESS
[FreeTextEntry1] : BP check [de-identified] : 76yoF PMH DM2, HTN, hypothyroidism, adrenal adenoma (stable), left gluteal lipoma presents for follow up ankle swelling\par \par swelling minimal now after amlodipine decreased\par unable to discontinue nsaid and gabapentin\par \par endorses fatigue with exertion\par has not seen cardiology for years\par denies chest pain, sob\par \par 22:\par \par 2-3 weeks ago, seen in ED for elevated blood pressure after   unexpectedly\par \par endorses sore throat after \par denies fever, chills, cough\par COVID negative\par \par 10/21:\par started carvedilol - no adverse effects\par BP unchanged\par \par foot edema resolved\par upcoming appt with cardiology

## 2022-10-29 RX ORDER — CYCLOBENZAPRINE HYDROCHLORIDE 5 MG/1
5 TABLET, FILM COATED ORAL
Qty: 30 | Refills: 0 | Status: DISCONTINUED | COMMUNITY
Start: 2021-11-02 | End: 2022-10-29

## 2022-11-01 ENCOUNTER — APPOINTMENT (OUTPATIENT)
Dept: CARDIOLOGY | Facility: CLINIC | Age: 77
End: 2022-11-01

## 2022-11-01 ENCOUNTER — NON-APPOINTMENT (OUTPATIENT)
Age: 77
End: 2022-11-01

## 2022-11-01 VITALS
SYSTOLIC BLOOD PRESSURE: 167 MMHG | HEIGHT: 61 IN | HEART RATE: 76 BPM | OXYGEN SATURATION: 97 % | DIASTOLIC BLOOD PRESSURE: 78 MMHG | TEMPERATURE: 93.3 F | BODY MASS INDEX: 46.26 KG/M2 | WEIGHT: 245 LBS

## 2022-11-01 DIAGNOSIS — R60.0 LOCALIZED EDEMA: ICD-10-CM

## 2022-11-01 PROCEDURE — 93000 ELECTROCARDIOGRAM COMPLETE: CPT

## 2022-11-01 PROCEDURE — 99204 OFFICE O/P NEW MOD 45 MIN: CPT

## 2022-11-02 ENCOUNTER — NON-APPOINTMENT (OUTPATIENT)
Age: 77
End: 2022-11-02

## 2022-11-09 ENCOUNTER — OUTPATIENT (OUTPATIENT)
Dept: OUTPATIENT SERVICES | Facility: HOSPITAL | Age: 77
LOS: 1 days | End: 2022-11-09
Payer: MEDICARE

## 2022-11-09 DIAGNOSIS — H26.9 UNSPECIFIED CATARACT: Chronic | ICD-10-CM

## 2022-11-09 DIAGNOSIS — Z90.89 ACQUIRED ABSENCE OF OTHER ORGANS: Chronic | ICD-10-CM

## 2022-11-09 DIAGNOSIS — Z98.890 OTHER SPECIFIED POSTPROCEDURAL STATES: Chronic | ICD-10-CM

## 2022-11-09 DIAGNOSIS — R60.0 LOCALIZED EDEMA: ICD-10-CM

## 2022-11-09 PROCEDURE — 93306 TTE W/DOPPLER COMPLETE: CPT

## 2022-11-09 PROCEDURE — 93306 TTE W/DOPPLER COMPLETE: CPT | Mod: 26

## 2022-11-09 NOTE — ADDENDUM
[FreeTextEntry1] : ADDENDUM 11/9: Echocardiogram showed preserved EF with no LVH, and normal LV diastolic function. Called phone number but line was busy on multiple attempts so unable to leave message.

## 2022-11-09 NOTE — END OF VISIT
[] : A student assisted with documenting this visit. I have reviewed and verified all information documented by the student, and made modifications to such information, when appropriate. [FreeTextEntry3] : 77 F with DM and HTN who presents for evaluation of LE edema. \par 1. LE edema: Patient is euvolemic on exam and there is no clinical suspicion for heart failure. She has non-pitting LE edema, possibly consistent with lymphedema. Given referral to vascular surgery for further evaluation. \par 2. HTN: Suboptimally controlled on lisinopril and carvedilol. Not sure why patient is on the latter as she has no compelling cardiac reason to be on beta blocker.\par -Cannot use amlodipine due to LE edema, or HCTZ due to allergy. Reluctant to add on spironolactone since last potassium was borderline at 5.1. As such, will add on hydralazine 25mg PO TID and titrate as needed. R/B/A discussed.    \par -Will send for echocardiogram to assess for LVH.\par \par Will call patient with results of testing at [phone], OK to leave voicemail.\par

## 2022-11-09 NOTE — ASSESSMENT
[FreeTextEntry1] : Pt is a 76 y/o F diabetes, HTN, hypothyroidism presenting with HTN and lower extremity edema. Since the patient does not have any shortness of breath and demonstrates clear lung fields, the pt's extremity edema is unlikely to be caused by heart failure. The edema is therefore likely being caused by venous insufficiency. Her BP is high, and responded well to amlodipine, but pt is unlikely to want this medication as an earlier removal of amlodipine from her treatment regimen seemed to improve her leg swelling. HCTZ is also not an option due to allergy. \par \par Plan:\par Lower extremity edema: likely caused by venous insufficiency. Recommend compression stockings and schedule follow up with vascular surgery. \par \par HTN: Pt currently taking lisinopril and carvedilol with minimal side effects. Plan to increase carvedilol dose. Add hydralazine, reassess pressure accordingly.

## 2022-11-09 NOTE — PHYSICAL EXAM
[No Acute Distress] : no acute distress [Normal Conjunctiva] : normal conjunctiva [Normal Venous Pressure] : normal venous pressure [Normal S1, S2] : normal S1, S2 [No Murmur] : no murmur [No Rub] : no rub [No Gallop] : no gallop [Clear Lung Fields] : clear lung fields [Good Air Entry] : good air entry [No Respiratory Distress] : no respiratory distress  [Moves all extremities] : moves all extremities [No Focal Deficits] : no focal deficits [Normal Speech] : normal speech [Alert and Oriented] : alert and oriented [Normal memory] : normal memory [de-identified] : Trace peripheral edema b/l with b/l varicosities

## 2022-11-09 NOTE — HISTORY OF PRESENT ILLNESS
[FreeTextEntry1] : Pt is a 76 y/o F with  HTN, diabetes, hypothyroidism referred by PCP for HTN and lower extremity leg swelling. In , after a few days of exertional activity, pt reports having swelling in her feet and legs. She stopped taking her amlodipine, and by the time she saw her PCP one week later, the swelling had diminished. Leg swelling remained diminished until late October, when legs began to swell up again. Pt reports today that leg swelling has been steadily worsening since late October, is worse with exercise and improves when lying down. Pt reports no shortness of breath, no issues breathing while asleep, no chest pain, no dizziness, and no episodes of fainting. Pt reports occasional, mild blurry vision, but is being followed closely by ophthalmology. \par \par Active medications: carvedilol, lisinopril, atorvastatin, metformin, gabapentin (for lower back pain), levothyroxine \par Fam Hx: Father  of probable MI @ 64, mother had mild HTN, paternal grandfather  in his 50s of unspecified kidney disease. \par Med hx: HTN, Hypothyroidism, diabetes, lower back nerve damage, hyperlipidemia, adrenal adenoma\par Surg Hx: 3 D&Cs, 2x cataract correction\par Social: Pt does not smoke or vape, has about one drink per month, and does not use any recreational drugs\par Allergies: HCTZ (pt reports severe itching reaction), wool\par

## 2022-12-05 ENCOUNTER — TRANSCRIPTION ENCOUNTER (OUTPATIENT)
Age: 77
End: 2022-12-05

## 2022-12-09 ENCOUNTER — APPOINTMENT (OUTPATIENT)
Dept: VASCULAR SURGERY | Facility: CLINIC | Age: 77
End: 2022-12-09

## 2022-12-09 VITALS
HEIGHT: 61 IN | SYSTOLIC BLOOD PRESSURE: 145 MMHG | BODY MASS INDEX: 46.26 KG/M2 | DIASTOLIC BLOOD PRESSURE: 81 MMHG | WEIGHT: 245 LBS | HEART RATE: 64 BPM

## 2022-12-09 PROCEDURE — 93970 EXTREMITY STUDY: CPT

## 2022-12-09 PROCEDURE — 99204 OFFICE O/P NEW MOD 45 MIN: CPT

## 2022-12-09 NOTE — HISTORY OF PRESENT ILLNESS
[FreeTextEntry1] : Patient is a 77-year-old female with past medical history significant for diabetes and hypertension and thyroid disease presenting to us for evaluation of bilateral lower extremity swelling and edema.\par \par No history of coronary artery disease.  No history of smoking.  No history of DVT.\par \par Of note patient has a history of pulmonary embolism which is unclear what the etiology was.\par \par No complaint of rest pain or claudication symptoms.  No history of tissue loss or cellulitis.\par \par The swelling does improve with elevation over the past 2 weeks has improved significantly.

## 2022-12-09 NOTE — ASSESSMENT
[FreeTextEntry1] : Patient presenting to us with bilateral lower extremity edema.\par \par No evidence of significant arterial insufficiency.\par \par No evidence of DVT.\par \par Patient has superficial venous insufficiency, right worse than the left.\par \par Recommend compression and elevation and weight loss.\par \par This was all discussed with the patient detail.

## 2022-12-09 NOTE — CONSULT LETTER
[Dear  ___] : Dear  [unfilled], [Consult Letter:] : I had the pleasure of evaluating your patient, [unfilled]. [Please see my note below.] : Please see my note below. [Consult Closing:] : Thank you very much for allowing me to participate in the care of this patient.  If you have any questions, please do not hesitate to contact me. [Sincerely,] : Sincerely, [FreeTextEntry3] : Modesto Martin M.D., F.WHITNEYS., R.P.BON.I.\par  of Vascular Surgery\par Assistant Professor of Radiology\par Director of Endovascular Program/ Vascular Access Center\par [facility]

## 2022-12-09 NOTE — PHYSICAL EXAM
[JVD] : no jugular venous distention  [Normal Breath Sounds] : Normal breath sounds [Normal Heart Sounds] : normal heart sounds [2+] : left 2+ [Ankle Swelling (On Exam)] : present [Ankle Swelling Bilaterally] : bilaterally  [Varicose Veins Of Lower Extremities] : present [Varicose Veins Of The Right Leg] : of the right leg [Ankle Swelling On The Left] : moderate [Abdomen Masses] : No abdominal masses

## 2022-12-12 ENCOUNTER — APPOINTMENT (OUTPATIENT)
Dept: ENDOCRINOLOGY | Facility: CLINIC | Age: 77
End: 2022-12-12

## 2022-12-12 ENCOUNTER — APPOINTMENT (OUTPATIENT)
Dept: INTERNAL MEDICINE | Facility: CLINIC | Age: 77
End: 2022-12-12

## 2022-12-12 VITALS
OXYGEN SATURATION: 99 % | SYSTOLIC BLOOD PRESSURE: 152 MMHG | BODY MASS INDEX: 46.63 KG/M2 | RESPIRATION RATE: 16 BRPM | DIASTOLIC BLOOD PRESSURE: 68 MMHG | WEIGHT: 247 LBS | TEMPERATURE: 97.7 F | HEIGHT: 61 IN

## 2022-12-12 VITALS
WEIGHT: 247 LBS | SYSTOLIC BLOOD PRESSURE: 174 MMHG | BODY MASS INDEX: 46.63 KG/M2 | HEIGHT: 61 IN | TEMPERATURE: 97.7 F | DIASTOLIC BLOOD PRESSURE: 85 MMHG | RESPIRATION RATE: 16 BRPM | OXYGEN SATURATION: 99 %

## 2022-12-12 DIAGNOSIS — I83.93 ASYMPTOMATIC VARICOSE VEINS OF BILATERAL LOWER EXTREMITIES: ICD-10-CM

## 2022-12-12 DIAGNOSIS — Z13.820 ENCOUNTER FOR SCREENING FOR OSTEOPOROSIS: ICD-10-CM

## 2022-12-12 LAB — GLUCOSE BLDC GLUCOMTR-MCNC: 84

## 2022-12-12 PROCEDURE — 82962 GLUCOSE BLOOD TEST: CPT

## 2022-12-12 PROCEDURE — 99214 OFFICE O/P EST MOD 30 MIN: CPT

## 2022-12-12 NOTE — END OF VISIT
[Time Spent: ___ minutes] : I have spent [unfilled] minutes of time on the encounter. Qbrexza Pregnancy And Lactation Text: There is no available data on Qbrexza use in pregnant women.  There is no available data on Qbrexza use in lactation.

## 2022-12-12 NOTE — HISTORY OF PRESENT ILLNESS
[FreeTextEntry1] : Patient feels well , she is asymptomatic. Her weight is unchanged. She is walking regularly and following the diet. Her blood glucose at home are not/well controlled, they are usually around 90 to 100 mg/dl. She denies low blood glucose during the night. She denies chest pain, or SOB. Denies numbness, tingling or burning sensation on her extremities. Taking her medications regularly. She has seen the Ophthalmologist recently. She has seen Podiatrist recently. She has seen the Cardiologist recently.\par

## 2022-12-12 NOTE — DATA REVIEWED
[FreeTextEntry1] : THe FBS and HbA1c were fine. Her TSH and free t4 were normal. The renal function was fine.

## 2022-12-12 NOTE — ASSESSMENT
[FreeTextEntry1] : Patient is doing well\par Her weight is stable\par her diabetes is well controlled\par She is clinically euthyroid\par Her lipid panel was fine\par Will order an US thyroid and a DEXA scan\par Will continue the same treatment\par

## 2022-12-12 NOTE — PHYSICAL EXAM
[Alert] : alert [Obese] : obese [No Acute Distress] : no acute distress [No Neck Mass] : no neck mass was observed [Thyroid Not Enlarged] : the thyroid was not enlarged [No Respiratory Distress] : no respiratory distress [Clear to Auscultation] : lungs were clear to auscultation bilaterally [Normal PMI] : the apical impulse was normal [Normal Rate] : heart rate was normal [Right foot was examined, including] : right foot ~C was examined, including visual inspection with sensory and pulse exams [Left foot was examined, including] : left foot ~C was examined, including visual inspection with sensory and pulse exams [Normal] : normal [Full ROM] : with limited range of motion [1+] : 1+ in the dorsalis pedis [Diminished Throughout Both Feet] : normal tactile sensation with monofilament testing throughout both feet

## 2022-12-13 PROBLEM — I83.93 VARICOSE VEINS OF LEGS: Status: ACTIVE | Noted: 2022-12-09

## 2022-12-13 PROBLEM — Z13.820 OSTEOPOROSIS SCREENING: Status: ACTIVE | Noted: 2020-07-17

## 2022-12-13 NOTE — HISTORY OF PRESENT ILLNESS
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 BP\par \par swelling minimal now after amlodipine decreased\par unable to discontinue nsaid and gabapentin\par \par endorses fatigue with exertion\par has not seen cardiology for years\par denies chest pain, sob\par \par 22:\par \par 2-3 weeks ago, seen in ED for elevated blood pressure after   unexpectedly\par \par endorses sore throat after \par denies fever, chills, cough\par COVID negative\par \par 10/21:\par started carvedilol - no adverse effects\par BP unchanged\par \par foot edema resolved\par upcoming appt with cardiology\par \par :\par vascular - advised compression stockings\par feet edema nearly resolved\par \par cardiology started patient on hydralazine\par repeat /80\par \par seen by endo - thyroid US and DEXA Rx provided

## 2022-12-29 ENCOUNTER — TRANSCRIPTION ENCOUNTER (OUTPATIENT)
Age: 77
End: 2022-12-29

## 2023-01-01 NOTE — ED PROVIDER NOTE - CROS ED RESP ALL NEG
[facility]  Progress Note    Rayray Hammond Patient Status:  [place]    2023 MRN [id number]   Location [facility] [address]  NURSERY Attending Brianne Colon DO   Hosp Day # 2 PCP Verify Pcp     [unfilled]    Subjective:    Feeding: breast with some formula supplementation   A little sleepier yesterday after circumcision making feeds harder. Supplemented with formula. Better today    Objective:    Vital Signs: Pulse 128, temperature 98.1 °F (36.7 °C), temperature source Axillary, resp. rate 52, height 20.28\" (51.5 cm), weight 3105 g, head circumference 34 cm (13.39\").  Birth Weight: Weight: 3410 g (Filed from Delivery Summary)  Weight Change Since Birth: -9%    Voiding:  yes  Stooling:  yes      Physical Exam:  HEENT: Head: sutures mobile, fontanelles normal size  Lungs: Clear to auscultation, unlabored breathing  Heart: Heart:regular rate and rhythm, normal S1, S2, no murmurs or gallops.  Neurologic:good tone          Labs:  Admission on 2023   Component Date Value Ref Range Status   • Bilirubin, Total 2023  2.0 - 6.0 mg/dL Final   • Bilirubin, Direct 2023  0.0 - 0.6 mg/dL Final    Direct bilirubin may be falsely decreased due to hemolysis.   • GLUCOSE, BEDSIDE - POINT OF CARE 2023 71  47 - 110 mg/dL Final    Notified RN     No results found for: ABORH, ABSCRN]  No results available in last 24 hours     Assessment:  NB male doing well    Plan:  CPM  Formula supplementation PRN    Chin Bailey MD  2023  10:26 AM     negative...

## 2023-01-11 ENCOUNTER — NON-APPOINTMENT (OUTPATIENT)
Age: 78
End: 2023-01-11

## 2023-01-13 ENCOUNTER — TRANSCRIPTION ENCOUNTER (OUTPATIENT)
Age: 78
End: 2023-01-13

## 2023-01-23 ENCOUNTER — OUTPATIENT (OUTPATIENT)
Dept: OUTPATIENT SERVICES | Facility: HOSPITAL | Age: 78
LOS: 1 days | End: 2023-01-23
Payer: MEDICARE

## 2023-01-23 ENCOUNTER — APPOINTMENT (OUTPATIENT)
Dept: ULTRASOUND IMAGING | Facility: HOSPITAL | Age: 78
End: 2023-01-23
Payer: MEDICARE

## 2023-01-23 ENCOUNTER — RESULT REVIEW (OUTPATIENT)
Age: 78
End: 2023-01-23

## 2023-01-23 DIAGNOSIS — Z98.890 OTHER SPECIFIED POSTPROCEDURAL STATES: Chronic | ICD-10-CM

## 2023-01-23 DIAGNOSIS — Z90.89 ACQUIRED ABSENCE OF OTHER ORGANS: Chronic | ICD-10-CM

## 2023-01-23 DIAGNOSIS — E11.9 TYPE 2 DIABETES MELLITUS WITHOUT COMPLICATIONS: ICD-10-CM

## 2023-01-23 DIAGNOSIS — H26.9 UNSPECIFIED CATARACT: Chronic | ICD-10-CM

## 2023-01-23 PROCEDURE — 76536 US EXAM OF HEAD AND NECK: CPT

## 2023-01-23 PROCEDURE — 76536 US EXAM OF HEAD AND NECK: CPT | Mod: 26

## 2023-02-06 ENCOUNTER — NON-APPOINTMENT (OUTPATIENT)
Age: 78
End: 2023-02-06

## 2023-02-06 DIAGNOSIS — U07.1 COVID-19: ICD-10-CM

## 2023-02-17 ENCOUNTER — TRANSCRIPTION ENCOUNTER (OUTPATIENT)
Age: 78
End: 2023-02-17

## 2023-02-18 ENCOUNTER — NON-APPOINTMENT (OUTPATIENT)
Age: 78
End: 2023-02-18

## 2023-03-21 ENCOUNTER — APPOINTMENT (OUTPATIENT)
Dept: CARDIOLOGY | Facility: CLINIC | Age: 78
End: 2023-03-21
Payer: MEDICARE

## 2023-03-21 VITALS
TEMPERATURE: 93.3 F | HEIGHT: 61 IN | WEIGHT: 242 LBS | DIASTOLIC BLOOD PRESSURE: 73 MMHG | HEART RATE: 77 BPM | BODY MASS INDEX: 45.69 KG/M2 | OXYGEN SATURATION: 95 % | SYSTOLIC BLOOD PRESSURE: 143 MMHG

## 2023-03-21 PROCEDURE — 99214 OFFICE O/P EST MOD 30 MIN: CPT

## 2023-03-22 NOTE — HISTORY OF PRESENT ILLNESS
[FreeTextEntry1] : 77-year-old female with HTN, HLD, DM, venous insufficiency, and hypothyroidism, with normal echocardiogram in 11/2022, who presents for follow-up visit.  \par \par At her last visit, hydralazine was added to her regimen in the setting of HCTZ allergy.  She reports has been doing well with the medication, with her blood pressures running in the 130s systolic at home.  She otherwise feels fine and denies any chest pain, dyspnea, palpitations, lightheadedness, or syncope. Denies LE edema, orthopnea, or PND. Patient reports compliance with all medications, denies adverse effects.\par \par Patient is also evaluated by vascular surgeon for her lower extremity edema and was diagnosed with venous insufficiency.  She was prescribed compression stockings but does not wear them regularly due to some discomfort.  Patient elevates her legs at night with a pillow.\par \par \par

## 2023-03-22 NOTE — ASSESSMENT
[FreeTextEntry1] : 77-year-old female with HTN, HLD, DM, venous insufficiency, and hypothyroidism, with normal echocardiogram in 11/2022, who presents for follow-up visit.  \par \par 1.  HTN: Well-controlled at home on lisinopril, carvedilol, and hydralazine\par – No evidence of LVH on echocardiogram\par – If need additional BP control there is room to go up on the hydralazine\par \par 2.  HLD: Patient is on atorvastatin\par – Has upcoming appointment with PMD, will have blood work checked at that time  \par \par 3.  Venous insufficiency: Has seen vascular surgeon, has as needed compression stockings\par \par Follow-up visit 6 months or as needed

## 2023-03-24 ENCOUNTER — TRANSCRIPTION ENCOUNTER (OUTPATIENT)
Age: 78
End: 2023-03-24

## 2023-04-10 ENCOUNTER — APPOINTMENT (OUTPATIENT)
Dept: ENDOCRINOLOGY | Facility: CLINIC | Age: 78
End: 2023-04-10
Payer: MEDICARE

## 2023-04-10 VITALS
OXYGEN SATURATION: 98 % | BODY MASS INDEX: 45.31 KG/M2 | RESPIRATION RATE: 16 BRPM | HEIGHT: 61 IN | DIASTOLIC BLOOD PRESSURE: 69 MMHG | HEART RATE: 68 BPM | SYSTOLIC BLOOD PRESSURE: 136 MMHG | WEIGHT: 240 LBS | TEMPERATURE: 97.2 F

## 2023-04-10 PROCEDURE — 99214 OFFICE O/P EST MOD 30 MIN: CPT | Mod: 25

## 2023-04-10 PROCEDURE — 82962 GLUCOSE BLOOD TEST: CPT

## 2023-04-11 LAB — GLUCOSE BLDC GLUCOMTR-MCNC: 95

## 2023-04-11 NOTE — ASSESSMENT
[FreeTextEntry1] : The patient is doing well\par She is asymptomatic except for back pan\par The diabetes is well controlled\par She is unable to lose weight\par She is clinically euthyroid\par The thyroid nodules are stable

## 2023-04-11 NOTE — DATA REVIEWED
[FreeTextEntry1] : The FBS and HbA1c indicates good diabetic control. The lipid panel was fine. The renal function is fine. No microalbuminuria. The TSH and free t4 were normal. The US thyroid was stable om 1/23

## 2023-04-11 NOTE — HISTORY OF PRESENT ILLNESS
[FreeTextEntry1] : Patient is doing well, denies feeling tired, having cold intolerance, or palpitations. Denies dryness of the skin or hair loss. She denies chest pain or SOB. Taking the Levothyroxine regularly ½ hour before breakfast. Her weight has not changed. Her FBS at home are well controlled. Her daughter has been found to have thyroid cancer.

## 2023-05-12 ENCOUNTER — TRANSCRIPTION ENCOUNTER (OUTPATIENT)
Age: 78
End: 2023-05-12

## 2023-05-16 ENCOUNTER — TRANSCRIPTION ENCOUNTER (OUTPATIENT)
Age: 78
End: 2023-05-16

## 2023-05-22 ENCOUNTER — NON-APPOINTMENT (OUTPATIENT)
Age: 78
End: 2023-05-22

## 2023-05-31 ENCOUNTER — NON-APPOINTMENT (OUTPATIENT)
Age: 78
End: 2023-05-31

## 2023-06-20 ENCOUNTER — TRANSCRIPTION ENCOUNTER (OUTPATIENT)
Age: 78
End: 2023-06-20

## 2023-06-21 ENCOUNTER — TRANSCRIPTION ENCOUNTER (OUTPATIENT)
Age: 78
End: 2023-06-21

## 2023-06-21 ENCOUNTER — RX RENEWAL (OUTPATIENT)
Age: 78
End: 2023-06-21

## 2023-07-18 ENCOUNTER — OUTPATIENT (OUTPATIENT)
Dept: OUTPATIENT SERVICES | Facility: HOSPITAL | Age: 78
LOS: 1 days | End: 2023-07-18
Payer: MEDICARE

## 2023-07-18 ENCOUNTER — APPOINTMENT (OUTPATIENT)
Dept: MRI IMAGING | Facility: HOSPITAL | Age: 78
End: 2023-07-18
Payer: MEDICARE

## 2023-07-18 DIAGNOSIS — R93.89 ABNORMAL FINDINGS ON DIAGNOSTIC IMAGING OF OTHER SPECIFIED BODY STRUCTURES: ICD-10-CM

## 2023-07-18 DIAGNOSIS — Z90.89 ACQUIRED ABSENCE OF OTHER ORGANS: Chronic | ICD-10-CM

## 2023-07-18 DIAGNOSIS — Z98.890 OTHER SPECIFIED POSTPROCEDURAL STATES: Chronic | ICD-10-CM

## 2023-07-18 DIAGNOSIS — H26.9 UNSPECIFIED CATARACT: Chronic | ICD-10-CM

## 2023-07-18 PROCEDURE — A9585: CPT

## 2023-07-18 PROCEDURE — 73720 MRI LWR EXTREMITY W/O&W/DYE: CPT

## 2023-07-18 PROCEDURE — 73720 MRI LWR EXTREMITY W/O&W/DYE: CPT | Mod: 26,LT,MH

## 2023-07-26 ENCOUNTER — APPOINTMENT (OUTPATIENT)
Dept: MRI IMAGING | Facility: HOSPITAL | Age: 78
End: 2023-07-26
Payer: MEDICARE

## 2023-07-26 ENCOUNTER — OUTPATIENT (OUTPATIENT)
Dept: OUTPATIENT SERVICES | Facility: HOSPITAL | Age: 78
LOS: 1 days | End: 2023-07-26
Payer: MEDICARE

## 2023-07-26 DIAGNOSIS — Z98.890 OTHER SPECIFIED POSTPROCEDURAL STATES: Chronic | ICD-10-CM

## 2023-07-26 DIAGNOSIS — D35.00 BENIGN NEOPLASM OF UNSPECIFIED ADRENAL GLAND: ICD-10-CM

## 2023-07-26 DIAGNOSIS — H26.9 UNSPECIFIED CATARACT: Chronic | ICD-10-CM

## 2023-07-26 DIAGNOSIS — Z90.89 ACQUIRED ABSENCE OF OTHER ORGANS: Chronic | ICD-10-CM

## 2023-07-26 PROCEDURE — A9585: CPT

## 2023-07-26 PROCEDURE — 74183 MRI ABD W/O CNTR FLWD CNTR: CPT

## 2023-07-26 PROCEDURE — 74183 MRI ABD W/O CNTR FLWD CNTR: CPT | Mod: 26,MH

## 2023-07-31 ENCOUNTER — APPOINTMENT (OUTPATIENT)
Dept: INTERNAL MEDICINE | Facility: CLINIC | Age: 78
End: 2023-07-31
Payer: MEDICARE

## 2023-07-31 VITALS
TEMPERATURE: 97 F | DIASTOLIC BLOOD PRESSURE: 76 MMHG | OXYGEN SATURATION: 97 % | BODY MASS INDEX: 45.88 KG/M2 | HEART RATE: 72 BPM | WEIGHT: 243 LBS | SYSTOLIC BLOOD PRESSURE: 146 MMHG | RESPIRATION RATE: 16 BRPM | HEIGHT: 61 IN

## 2023-07-31 DIAGNOSIS — M25.472 EFFUSION, RIGHT ANKLE: ICD-10-CM

## 2023-07-31 DIAGNOSIS — M25.471 EFFUSION, RIGHT ANKLE: ICD-10-CM

## 2023-07-31 PROCEDURE — 99214 OFFICE O/P EST MOD 30 MIN: CPT

## 2023-08-08 ENCOUNTER — APPOINTMENT (OUTPATIENT)
Dept: SURGICAL ONCOLOGY | Facility: CLINIC | Age: 78
End: 2023-08-08
Payer: MEDICARE

## 2023-08-08 DIAGNOSIS — M79.89 OTHER SPECIFIED SOFT TISSUE DISORDERS: ICD-10-CM

## 2023-08-08 PROCEDURE — 99214 OFFICE O/P EST MOD 30 MIN: CPT

## 2023-08-08 NOTE — PHYSICAL EXAM
[Normal] : supple, no neck mass and thyroid not enlarged [Normal Supraclavicular Lymph Nodes] : normal supraclavicular lymph nodes [Normal Groin Lymph Nodes] : normal groin lymph nodes [Normal] : oriented to person, place and time, with appropriate affect [de-identified] : no palpable mass ion the left buttock

## 2023-08-08 NOTE — HISTORY OF PRESENT ILLNESS
[de-identified] : Ms. EMMANUEL VEGA is a 77 year old woman who returns today for ongoing follow-up  She had initially presented in April 2019  for an abdominal MRI for surveillance of an adrenal adenoma. Study demonstrated a stable 1.1 cm left adrenal adenoma, benign-appearing liver lesion and an incidental 4.5 cm mass in the left gluteal region. As such, she was referred for an MRI of the left hip which revealed a 4.8 cm soft tissue lesion with central necrosis interposed between the left gluteus medius and minimus muscles.  Patient reported no symptoms other than intermittent chronic back and sciatic type pain. Patient is s/p CT guided percutaneous needle biopsy of left gluteal mass on 05/06/2019 by IR. 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    We discussed surgical excision, however, she opted for ongoing MRI surveillance.  Most recent MRI in January 2020 re demonstrated the mass which is decreased in size from prior exam, currently measuring 4.1 cm.   She underwent US guided FNA of a right thyroid lobe nodule 1/21/20 with Dr. Shiva Montoya.   Surveillance MRI in July 2021 demonstrated enhancing soft tissue tumor in the left gluteus medius muscle. No interval change in size or appearance compared to the previous MRI.  Surveillance MRI from 7/2022, no interval change to size or appearance.  MRI lower left leg 7/24/23:  Heterogeneous enhancing mass of the left gluteus medius is again seen measuring approximately 3.8 x 2.7 x 4.0 cm that is relatively stable to mildly decreased in size over time  MRI abdomen 6/21/23: Stable 1.3 cm left adrenal adenoma   Her past medical history includes HTN, DM 2, hypothyroidism, PE in 2014 (felt secondary to prolonged medroxyprogesterone use for dysfunctional uterine bleeding) she was on Coumadin for 3 years but was advised to stop per her hematologist.  She is feeling well and denies any symptoms related to the mass.

## 2023-08-08 NOTE — ASSESSMENT
[FreeTextEntry1] : IMP:   Left gluteal spindle cell lipoma Enhancing soft tissue tumor in the left gluteus medius muscle. No interval change in size or appearance compared to the previous MRI.  MRI 7/2023 - no interval change in size or appearance   Plan: Observation only at this point RTO yearly with MRI

## 2023-08-08 NOTE — CONSULT LETTER
[Dear  ___] : Dear  [unfilled], [Courtesy Letter:] : I had the pleasure of seeing your patient, [unfilled], in my office today. [Please see my note below.] : Please see my note below. [Consult Closing:] : Thank you very much for allowing me to participate in the care of this patient.  If you have any questions, please do not hesitate to contact me. [Sincerely,] : Sincerely, [FreeTextEntry1] : I will keep you informed of my follow-up. [FreeTextEntry3] : Jean Phillips MD FACS Chief of Surgical Oncology

## 2023-08-14 ENCOUNTER — APPOINTMENT (OUTPATIENT)
Dept: ENDOCRINOLOGY | Facility: CLINIC | Age: 78
End: 2023-08-14
Payer: MEDICARE

## 2023-08-14 VITALS
SYSTOLIC BLOOD PRESSURE: 137 MMHG | RESPIRATION RATE: 16 BRPM | DIASTOLIC BLOOD PRESSURE: 82 MMHG | HEIGHT: 61 IN | WEIGHT: 239 LBS | OXYGEN SATURATION: 97 % | HEART RATE: 89 BPM | BODY MASS INDEX: 45.12 KG/M2 | TEMPERATURE: 97.9 F

## 2023-08-14 DIAGNOSIS — L30.9 DERMATITIS, UNSPECIFIED: ICD-10-CM

## 2023-08-14 LAB — GLUCOSE BLDC GLUCOMTR-MCNC: 113

## 2023-08-14 PROCEDURE — 99214 OFFICE O/P EST MOD 30 MIN: CPT

## 2023-08-14 PROCEDURE — 82962 GLUCOSE BLOOD TEST: CPT

## 2023-08-14 NOTE — REASON FOR VISIT
[Follow - Up] : a follow-up visit [Adrenal Evaluation/Adrenal Disorder] : adrenal evaluation/adrenal disorder [DM Type 2] : DM Type 2 [Hypothyroidism] : hypothyroidism [Thyroid nodule/ MNG] : thyroid nodule/ MNG [Weight Management/Obesity] : weight management/obesity [Other___] : [unfilled]

## 2023-08-14 NOTE — PHYSICAL EXAM
[Obese] : obese [No Acute Distress] : no acute distress [No Neck Mass] : no neck mass was observed [Thyroid Not Enlarged] : the thyroid was not enlarged [No Respiratory Distress] : no respiratory distress [Clear to Auscultation] : lungs were clear to auscultation bilaterally [Normal PMI] : the apical impulse was normal [Normal Rate] : heart rate was normal [Right foot was examined, including] : right foot ~C was examined, including visual inspection with sensory and pulse exams [Left foot was examined, including] : left foot ~C was examined, including visual inspection with sensory and pulse exams [1+] : 1+ in the dorsalis pedis

## 2023-08-14 NOTE — ASSESSMENT
[FreeTextEntry1] : Patient is doing well She is losing weight The diabetes is well controlled No Diabetic Retinopathy or Nephropathy present at this time The lipid panel was fine Will continue the same treatment Her BP was also fine Advised to take her medications regularly Advised to follow the diet and exercise regularly The thyroid tests were normal The last US thyroid was stable on1/23

## 2023-08-14 NOTE — HISTORY OF PRESENT ILLNESS
[FreeTextEntry1] : Patient feels well, she is asymptomatic. She has lost/. She is walking regularly and following the diet. Her blood sugars at home are well controlled, they are usually around 100 mg/dl. She denies low blood glucose during the night. She denies chest pain, or SOB. Denies numbness, tingling or burning sensation on her extremities. Taking her medications regularly. She has seen the Ophthalmologist recently. She has seen the Podiatrist recently. She has not seen the Cardiologist recently.

## 2023-08-18 PROBLEM — M25.471 ANKLE EDEMA, BILATERAL: Status: ACTIVE | Noted: 2022-07-01

## 2023-08-18 RX ORDER — NIRMATRELVIR AND RITONAVIR 300-100 MG
20 X 150 MG & KIT ORAL
Qty: 1 | Refills: 1 | Status: DISCONTINUED | COMMUNITY
Start: 2023-02-06 | End: 2023-08-18

## 2023-08-18 NOTE — HISTORY OF PRESENT ILLNESS
[FreeTextEntry1] : routine follow up [de-identified] : 77yoF PMH DM2, HTN, hypothyroidism, adrenal adenoma (stable), left gluteal lipoma presents for routine follow up  swelling minimal now after amlodipine decreased unable to discontinue nsaid and gabapentin  endorses fatigue with exertion has not seen cardiology for years denies chest pain, sob  22:  2-3 weeks ago, seen in ED for elevated blood pressure after   unexpectedly  endorses sore throat after  denies fever, chills, cough COVID negative  10/21: started carvedilol - no adverse effects BP unchanged  foot edema resolved upcoming appt with cardiology  : vascular - advised compression stockings feet edema nearly resolved  cardiology started patient on hydralazine repeat /80  seen by endo - thyroid US and DEXA Rx provided  : ankle edema resolved  required prednisone for viral URI

## 2023-08-18 NOTE — PHYSICAL EXAM
[No Acute Distress] : no acute distress [Well Nourished] : well nourished [Well Developed] : well developed [Well-Appearing] : well-appearing [Normal Sclera/Conjunctiva] : normal sclera/conjunctiva [PERRL] : pupils equal round and reactive to light [EOMI] : extraocular movements intact [Normal Outer Ear/Nose] : the outer ears and nose were normal in appearance [Normal Oropharynx] : the oropharynx was normal [No JVD] : no jugular venous distention [No Lymphadenopathy] : no lymphadenopathy [Supple] : supple [Thyroid Normal, No Nodules] : the thyroid was normal and there were no nodules present [No Respiratory Distress] : no respiratory distress  [No Accessory Muscle Use] : no accessory muscle use [Normal Rate] : normal rate  [Clear to Auscultation] : lungs were clear to auscultation bilaterally [Regular Rhythm] : with a regular rhythm [Normal S1, S2] : normal S1 and S2 [No Murmur] : no murmur heard [No Carotid Bruits] : no carotid bruits [No Abdominal Bruit] : a ~M bruit was not heard ~T in the abdomen [No Varicosities] : no varicosities [Pedal Pulses Present] : the pedal pulses are present [No Edema] : there was no peripheral edema [No Palpable Aorta] : no palpable aorta [No Extremity Clubbing/Cyanosis] : no extremity clubbing/cyanosis [Soft] : abdomen soft [Non Tender] : non-tender [No Masses] : no abdominal mass palpated [Non-distended] : non-distended [No HSM] : no HSM [Normal Bowel Sounds] : normal bowel sounds [Normal Posterior Cervical Nodes] : no posterior cervical lymphadenopathy [Normal Anterior Cervical Nodes] : no anterior cervical lymphadenopathy [No CVA Tenderness] : no CVA  tenderness [No Spinal Tenderness] : no spinal tenderness [Grossly Normal Strength/Tone] : grossly normal strength/tone [No Joint Swelling] : no joint swelling [No Rash] : no rash [Coordination Grossly Intact] : coordination grossly intact [No Focal Deficits] : no focal deficits [Normal Gait] : normal gait [Deep Tendon Reflexes (DTR)] : deep tendon reflexes were 2+ and symmetric [Normal Affect] : the affect was normal [Normal Insight/Judgement] : insight and judgment were intact

## 2023-08-21 ENCOUNTER — TRANSCRIPTION ENCOUNTER (OUTPATIENT)
Age: 78
End: 2023-08-21

## 2023-09-13 ENCOUNTER — TRANSCRIPTION ENCOUNTER (OUTPATIENT)
Age: 78
End: 2023-09-13

## 2023-09-19 ENCOUNTER — APPOINTMENT (OUTPATIENT)
Dept: CARDIOLOGY | Facility: CLINIC | Age: 78
End: 2023-09-19
Payer: MEDICARE

## 2023-09-19 ENCOUNTER — TRANSCRIPTION ENCOUNTER (OUTPATIENT)
Age: 78
End: 2023-09-19

## 2023-09-19 ENCOUNTER — NON-APPOINTMENT (OUTPATIENT)
Age: 78
End: 2023-09-19

## 2023-09-19 VITALS
SYSTOLIC BLOOD PRESSURE: 165 MMHG | HEART RATE: 68 BPM | DIASTOLIC BLOOD PRESSURE: 72 MMHG | HEIGHT: 61 IN | WEIGHT: 239 LBS | TEMPERATURE: 98.2 F | BODY MASS INDEX: 45.12 KG/M2 | OXYGEN SATURATION: 98 %

## 2023-09-19 VITALS — SYSTOLIC BLOOD PRESSURE: 131 MMHG | DIASTOLIC BLOOD PRESSURE: 70 MMHG

## 2023-09-19 DIAGNOSIS — E78.5 HYPERLIPIDEMIA, UNSPECIFIED: ICD-10-CM

## 2023-09-19 PROCEDURE — 93000 ELECTROCARDIOGRAM COMPLETE: CPT

## 2023-09-19 PROCEDURE — 99214 OFFICE O/P EST MOD 30 MIN: CPT

## 2023-09-21 PROBLEM — E78.5 HLD (HYPERLIPIDEMIA): Status: ACTIVE | Noted: 2018-11-27

## 2023-09-22 ENCOUNTER — TRANSCRIPTION ENCOUNTER (OUTPATIENT)
Age: 78
End: 2023-09-22

## 2023-09-23 ENCOUNTER — RX RENEWAL (OUTPATIENT)
Age: 78
End: 2023-09-23

## 2023-11-06 ENCOUNTER — TRANSCRIPTION ENCOUNTER (OUTPATIENT)
Age: 78
End: 2023-11-06

## 2023-11-09 ENCOUNTER — TRANSCRIPTION ENCOUNTER (OUTPATIENT)
Age: 78
End: 2023-11-09

## 2023-12-11 ENCOUNTER — APPOINTMENT (OUTPATIENT)
Dept: ENDOCRINOLOGY | Facility: CLINIC | Age: 78
End: 2023-12-11
Payer: MEDICARE

## 2023-12-11 VITALS
TEMPERATURE: 95.1 F | DIASTOLIC BLOOD PRESSURE: 74 MMHG | HEIGHT: 61 IN | OXYGEN SATURATION: 98 % | HEART RATE: 76 BPM | WEIGHT: 244 LBS | RESPIRATION RATE: 16 BRPM | BODY MASS INDEX: 46.07 KG/M2 | SYSTOLIC BLOOD PRESSURE: 162 MMHG

## 2023-12-11 DIAGNOSIS — D35.00 BENIGN NEOPLASM OF UNSPECIFIED ADRENAL GLAND: ICD-10-CM

## 2023-12-11 LAB — GLUCOSE BLDC GLUCOMTR-MCNC: 96

## 2023-12-11 PROCEDURE — 99214 OFFICE O/P EST MOD 30 MIN: CPT | Mod: 25

## 2023-12-11 PROCEDURE — 82962 GLUCOSE BLOOD TEST: CPT

## 2023-12-13 ENCOUNTER — TRANSCRIPTION ENCOUNTER (OUTPATIENT)
Age: 78
End: 2023-12-13

## 2023-12-15 ENCOUNTER — TRANSCRIPTION ENCOUNTER (OUTPATIENT)
Age: 78
End: 2023-12-15

## 2024-02-05 ENCOUNTER — APPOINTMENT (OUTPATIENT)
Dept: INTERNAL MEDICINE | Facility: CLINIC | Age: 79
End: 2024-02-05
Payer: MEDICARE

## 2024-02-05 VITALS
WEIGHT: 241.6 LBS | TEMPERATURE: 97.3 F | SYSTOLIC BLOOD PRESSURE: 130 MMHG | DIASTOLIC BLOOD PRESSURE: 79 MMHG | HEART RATE: 73 BPM | RESPIRATION RATE: 16 BRPM | HEIGHT: 61 IN | BODY MASS INDEX: 45.61 KG/M2 | OXYGEN SATURATION: 97 %

## 2024-02-05 DIAGNOSIS — I10 ESSENTIAL (PRIMARY) HYPERTENSION: ICD-10-CM

## 2024-02-05 DIAGNOSIS — C44.310 BASAL CELL CARCINOMA OF SKIN OF UNSPECIFIED PARTS OF FACE: ICD-10-CM

## 2024-02-05 PROCEDURE — 99214 OFFICE O/P EST MOD 30 MIN: CPT

## 2024-02-05 PROCEDURE — G2211 COMPLEX E/M VISIT ADD ON: CPT

## 2024-02-05 RX ORDER — AMMONIUM LACTATE 12 %
CREAM (GRAM) TOPICAL
Refills: 0 | Status: DISCONTINUED | COMMUNITY
End: 2024-02-05

## 2024-02-05 RX ORDER — NYSTATIN AND TRIAMCINOLONE ACETONIDE 100000; 1 MG/G; MG/G
100000-0.1 CREAM TOPICAL
Qty: 30 | Refills: 1 | Status: DISCONTINUED | COMMUNITY
End: 2024-02-05

## 2024-02-05 RX ORDER — ALBUTEROL SULFATE 90 UG/1
108 (90 BASE) INHALANT RESPIRATORY (INHALATION)
Qty: 6 | Refills: 0 | Status: DISCONTINUED | COMMUNITY
Start: 2023-05-13 | End: 2024-02-05

## 2024-02-05 RX ORDER — FLUTICASONE PROPIONATE 50 UG/1
50 SPRAY, METERED NASAL
Qty: 16 | Refills: 0 | Status: DISCONTINUED | COMMUNITY
Start: 2023-05-23 | End: 2024-02-05

## 2024-02-05 RX ORDER — PREDNISONE 20 MG/1
20 TABLET ORAL
Qty: 10 | Refills: 0 | Status: DISCONTINUED | COMMUNITY
Start: 2023-06-01 | End: 2024-02-05

## 2024-02-05 RX ORDER — BENZONATATE 100 MG/1
100 CAPSULE ORAL
Qty: 21 | Refills: 0 | Status: DISCONTINUED | COMMUNITY
Start: 2023-06-01 | End: 2024-02-05

## 2024-02-05 RX ORDER — DOXYCYCLINE HYCLATE 100 MG/1
100 TABLET ORAL
Qty: 14 | Refills: 0 | Status: DISCONTINUED | COMMUNITY
Start: 2023-05-23 | End: 2024-02-05

## 2024-02-05 RX ORDER — AZITHROMYCIN 250 MG/1
250 TABLET, FILM COATED ORAL
Qty: 6 | Refills: 0 | Status: DISCONTINUED | COMMUNITY
Start: 2023-05-13 | End: 2024-02-05

## 2024-02-05 RX ORDER — GABAPENTIN 300 MG/1
300 CAPSULE ORAL
Refills: 0 | Status: DISCONTINUED | COMMUNITY
End: 2024-02-05

## 2024-02-17 PROBLEM — I10 HYPERTENSION: Status: ACTIVE | Noted: 2017-11-21

## 2024-02-17 PROBLEM — C44.310 BASAL CELL CARCINOMA (BCC) OF SKIN OF FACE, UNSPECIFIED PART OF FACE: Status: ACTIVE | Noted: 2024-02-17

## 2024-02-17 NOTE — HISTORY OF PRESENT ILLNESS
[FreeTextEntry1] : routine follow up [de-identified] : 78yoF PMH DM2, HTN, hypothyroidism, adrenal adenoma (stable), left gluteal lipoma presents for routine follow up  swelling minimal now after amlodipine decreased unable to discontinue nsaid and gabapentin  endorses fatigue with exertion has not seen cardiology for years denies chest pain, sob  22:  2-3 weeks ago, seen in ED for elevated blood pressure after   unexpectedly  endorses sore throat after  denies fever, chills, cough COVID negative  10/21: started carvedilol - no adverse effects BP unchanged  foot edema resolved upcoming appt with cardiology  : vascular - advised compression stockings feet edema nearly resolved  cardiology started patient on hydralazine repeat /80  seen by endo - thyroid US and DEXA Rx provided  : ankle edema resolved  required prednisone for viral URI  2.5.24: doing well pedal edema resolved  s/p Moh's face for BCC - dermatology Dr Hutton

## 2024-04-02 ENCOUNTER — NON-APPOINTMENT (OUTPATIENT)
Age: 79
End: 2024-04-02

## 2024-05-29 ENCOUNTER — RESULT REVIEW (OUTPATIENT)
Age: 79
End: 2024-05-29

## 2024-05-29 ENCOUNTER — OUTPATIENT (OUTPATIENT)
Dept: OUTPATIENT SERVICES | Facility: HOSPITAL | Age: 79
LOS: 1 days | End: 2024-05-29
Payer: MEDICARE

## 2024-05-29 ENCOUNTER — APPOINTMENT (OUTPATIENT)
Dept: ULTRASOUND IMAGING | Facility: HOSPITAL | Age: 79
End: 2024-05-29
Payer: MEDICARE

## 2024-05-29 DIAGNOSIS — Z90.89 ACQUIRED ABSENCE OF OTHER ORGANS: Chronic | ICD-10-CM

## 2024-05-29 DIAGNOSIS — Z98.890 OTHER SPECIFIED POSTPROCEDURAL STATES: Chronic | ICD-10-CM

## 2024-05-29 DIAGNOSIS — E04.1 NONTOXIC SINGLE THYROID NODULE: ICD-10-CM

## 2024-05-29 DIAGNOSIS — H26.9 UNSPECIFIED CATARACT: Chronic | ICD-10-CM

## 2024-05-29 PROCEDURE — 76536 US EXAM OF HEAD AND NECK: CPT | Mod: 26

## 2024-05-29 PROCEDURE — 76536 US EXAM OF HEAD AND NECK: CPT

## 2024-06-11 ENCOUNTER — APPOINTMENT (OUTPATIENT)
Dept: ENDOCRINOLOGY | Facility: CLINIC | Age: 79
End: 2024-06-11
Payer: MEDICARE

## 2024-06-11 VITALS
TEMPERATURE: 97.3 F | RESPIRATION RATE: 16 BRPM | OXYGEN SATURATION: 97 % | HEART RATE: 63 BPM | HEIGHT: 61 IN | SYSTOLIC BLOOD PRESSURE: 111 MMHG | WEIGHT: 243 LBS | BODY MASS INDEX: 45.88 KG/M2 | DIASTOLIC BLOOD PRESSURE: 78 MMHG

## 2024-06-11 DIAGNOSIS — E11.9 TYPE 2 DIABETES MELLITUS W/OUT COMPLICATIONS: ICD-10-CM

## 2024-06-11 DIAGNOSIS — E04.1 NONTOXIC SINGLE THYROID NODULE: ICD-10-CM

## 2024-06-11 DIAGNOSIS — E03.9 HYPOTHYROIDISM, UNSPECIFIED: ICD-10-CM

## 2024-06-11 DIAGNOSIS — E66.01 MORBID (SEVERE) OBESITY DUE TO EXCESS CALORIES: ICD-10-CM

## 2024-06-11 LAB — GLUCOSE BLDC GLUCOMTR-MCNC: 96

## 2024-06-11 PROCEDURE — 82962 GLUCOSE BLOOD TEST: CPT

## 2024-06-11 PROCEDURE — 99214 OFFICE O/P EST MOD 30 MIN: CPT | Mod: 25

## 2024-06-11 NOTE — ASSESSMENT
[FreeTextEntry1] : Patient is doing well Her weight has increased The diabetes is well controlled No Diabetic Retinopathy or Nephropathy present at this time The lipid panel was fine Will continue the same treatment Her BP was also fine Advised to follow the diet and exercise regularly The thyroid tests were normal Last year the MRI of the abdomen revealed the adrenal adenoma smaller Th US thyroid is stable

## 2024-06-11 NOTE — PHYSICAL EXAM
[Alert] : alert [Obese] : obese [No Acute Distress] : no acute distress [No Neck Mass] : no neck mass was observed [Thyroid Not Enlarged] : the thyroid was not enlarged [No Respiratory Distress] : no respiratory distress [Clear to Auscultation] : lungs were clear to auscultation bilaterally [Normal PMI] : the apical impulse was normal [Normal Rate] : heart rate was normal [No Edema] : no peripheral edema [Right foot was examined, including] : right foot ~C was examined, including visual inspection with sensory and pulse exams [Left foot was examined, including] : left foot ~C was examined, including visual inspection with sensory and pulse exams [1+] : 1+ in the dorsalis pedis

## 2024-06-11 NOTE — HISTORY OF PRESENT ILLNESS
[FreeTextEntry1] : Patient feels well, she is asymptomatic. She has gained weight. She is exercising regularly and following the diet. Her blood sugars at home are not/well controlled, they are usually around 110 mg/dl. She denies low blood glucose during the night. She denies chest pain, or SOB. Denies numbness, tingling or burning sensation on her extremities. Taking her medications regularly. She has seen the Ophthalmologist recently. She has seen the Podiatrist recently. She has seen the Cardiologist recently.

## 2024-06-15 ENCOUNTER — RX RENEWAL (OUTPATIENT)
Age: 79
End: 2024-06-15

## 2024-06-15 RX ORDER — LEVOTHYROXINE SODIUM 0.1 MG/1
100 TABLET ORAL DAILY
Qty: 90 | Refills: 3 | Status: ACTIVE | COMMUNITY
Start: 2018-06-01 | End: 1900-01-01

## 2024-06-15 RX ORDER — METFORMIN HYDROCHLORIDE 500 MG/1
500 TABLET, COATED ORAL
Qty: 90 | Refills: 3 | Status: ACTIVE | COMMUNITY
Start: 1900-01-01 | End: 1900-01-01

## 2024-06-17 ENCOUNTER — TRANSCRIPTION ENCOUNTER (OUTPATIENT)
Age: 79
End: 2024-06-17

## 2024-06-17 ENCOUNTER — RX RENEWAL (OUTPATIENT)
Age: 79
End: 2024-06-17

## 2024-06-17 RX ORDER — CARVEDILOL 3.12 MG/1
3.12 TABLET, FILM COATED ORAL
Qty: 180 | Refills: 3 | Status: ACTIVE | COMMUNITY
Start: 2022-09-19 | End: 1900-01-01

## 2024-06-17 RX ORDER — LISINOPRIL 10 MG/1
10 TABLET ORAL
Qty: 360 | Refills: 3 | Status: ACTIVE | COMMUNITY
Start: 2023-06-21 | End: 1900-01-01

## 2024-06-17 RX ORDER — ATORVASTATIN CALCIUM 20 MG/1
20 TABLET, FILM COATED ORAL
Qty: 90 | Refills: 3 | Status: ACTIVE | COMMUNITY
Start: 2021-01-14 | End: 1900-01-01

## 2024-06-17 RX ORDER — HYDRALAZINE HYDROCHLORIDE 25 MG/1
25 TABLET ORAL 3 TIMES DAILY
Qty: 270 | Refills: 2 | Status: ACTIVE | COMMUNITY
Start: 2022-11-01 | End: 1900-01-01

## 2024-06-18 ENCOUNTER — TRANSCRIPTION ENCOUNTER (OUTPATIENT)
Age: 79
End: 2024-06-18

## 2024-07-10 ENCOUNTER — TRANSCRIPTION ENCOUNTER (OUTPATIENT)
Age: 79
End: 2024-07-10

## 2024-07-11 ENCOUNTER — TRANSCRIPTION ENCOUNTER (OUTPATIENT)
Age: 79
End: 2024-07-11

## 2024-07-14 ENCOUNTER — NON-APPOINTMENT (OUTPATIENT)
Age: 79
End: 2024-07-14

## 2024-07-15 ENCOUNTER — APPOINTMENT (OUTPATIENT)
Dept: CARDIOLOGY | Facility: CLINIC | Age: 79
End: 2024-07-15
Payer: MEDICARE

## 2024-07-15 VITALS
TEMPERATURE: 96.2 F | BODY MASS INDEX: 46.1 KG/M2 | DIASTOLIC BLOOD PRESSURE: 71 MMHG | OXYGEN SATURATION: 95 % | HEART RATE: 68 BPM | SYSTOLIC BLOOD PRESSURE: 134 MMHG | WEIGHT: 244 LBS

## 2024-07-15 DIAGNOSIS — I10 ESSENTIAL (PRIMARY) HYPERTENSION: ICD-10-CM

## 2024-07-15 DIAGNOSIS — E78.5 HYPERLIPIDEMIA, UNSPECIFIED: ICD-10-CM

## 2024-07-15 DIAGNOSIS — E11.9 TYPE 2 DIABETES MELLITUS W/OUT COMPLICATIONS: ICD-10-CM

## 2024-07-15 PROCEDURE — 99214 OFFICE O/P EST MOD 30 MIN: CPT

## 2024-07-17 RX ORDER — TOBRAMYCIN AND DEXAMETHASONE 3; 1 MG/ML; MG/ML
0.3-0.1 SUSPENSION/ DROPS OPHTHALMIC
Qty: 2 | Refills: 0 | Status: ACTIVE | COMMUNITY
Start: 2024-05-03

## 2024-07-17 RX ORDER — NYSTATIN 100000 1/G
100000 POWDER TOPICAL
Qty: 60 | Refills: 0 | Status: ACTIVE | COMMUNITY
Start: 2023-10-16

## 2024-07-29 ENCOUNTER — OUTPATIENT (OUTPATIENT)
Dept: OUTPATIENT SERVICES | Facility: HOSPITAL | Age: 79
LOS: 1 days | End: 2024-07-29
Payer: MEDICARE

## 2024-07-29 ENCOUNTER — APPOINTMENT (OUTPATIENT)
Dept: MRI IMAGING | Facility: HOSPITAL | Age: 79
End: 2024-07-29
Payer: MEDICARE

## 2024-07-29 DIAGNOSIS — H26.9 UNSPECIFIED CATARACT: Chronic | ICD-10-CM

## 2024-07-29 DIAGNOSIS — M79.89 OTHER SPECIFIED SOFT TISSUE DISORDERS: ICD-10-CM

## 2024-07-29 DIAGNOSIS — Z90.89 ACQUIRED ABSENCE OF OTHER ORGANS: Chronic | ICD-10-CM

## 2024-07-29 DIAGNOSIS — Z98.890 OTHER SPECIFIED POSTPROCEDURAL STATES: Chronic | ICD-10-CM

## 2024-07-29 PROCEDURE — A9585: CPT

## 2024-07-29 PROCEDURE — 73720 MRI LWR EXTREMITY W/O&W/DYE: CPT

## 2024-07-29 PROCEDURE — 73720 MRI LWR EXTREMITY W/O&W/DYE: CPT | Mod: 26,LT,MH

## 2024-08-05 ENCOUNTER — APPOINTMENT (OUTPATIENT)
Dept: INTERNAL MEDICINE | Facility: CLINIC | Age: 79
End: 2024-08-05

## 2024-08-05 PROCEDURE — G2211 COMPLEX E/M VISIT ADD ON: CPT

## 2024-08-05 PROCEDURE — 99214 OFFICE O/P EST MOD 30 MIN: CPT

## 2024-08-08 PROBLEM — D17.1 LIPOMA OF BUTTOCK: Status: ACTIVE | Noted: 2024-08-08

## 2024-08-08 NOTE — HISTORY OF PRESENT ILLNESS
[FreeTextEntry1] : routine follow up [de-identified] : 78yoF PMH DM2, HTN, hypothyroidism, adrenal adenoma (stable), left gluteal lipoma w spindle cell presents for routine follow up  swelling minimal now after amlodipine decreased unable to discontinue nsaid and gabapentin  endorses fatigue with exertion has not seen cardiology for years denies chest pain, sob  22:  2-3 weeks ago, seen in ED for elevated blood pressure after   unexpectedly  endorses sore throat after  denies fever, chills, cough COVID negative  10/21: started carvedilol - no adverse effects BP unchanged  foot edema resolved upcoming appt with cardiology  : vascular - advised compression stockings feet edema nearly resolved  cardiology started patient on hydralazine repeat /80  seen by endo - thyroid US and DEXA Rx provided  : ankle edema resolved  required prednisone for viral URI  2.5.24: doing well pedal edema resolved  s/p Moh's face for BCC - dermatology Dr Hutton  8.5.24: surveillance MRI : increased size left gluteal mass - will dw Dr Phillips   a1c 5.9%, LDL 46, TSH 0.71  needs flonase refill for allergic rhinitis

## 2024-08-08 NOTE — HISTORY OF PRESENT ILLNESS
[FreeTextEntry1] : routine follow up [de-identified] : 78yoF PMH DM2, HTN, hypothyroidism, adrenal adenoma (stable), left gluteal lipoma w spindle cell presents for routine follow up  swelling minimal now after amlodipine decreased unable to discontinue nsaid and gabapentin  endorses fatigue with exertion has not seen cardiology for years denies chest pain, sob  22:  2-3 weeks ago, seen in ED for elevated blood pressure after   unexpectedly  endorses sore throat after  denies fever, chills, cough COVID negative  10/21: started carvedilol - no adverse effects BP unchanged  foot edema resolved upcoming appt with cardiology  : vascular - advised compression stockings feet edema nearly resolved  cardiology started patient on hydralazine repeat /80  seen by endo - thyroid US and DEXA Rx provided  : ankle edema resolved  required prednisone for viral URI  2.5.24: doing well pedal edema resolved  s/p Moh's face for BCC - dermatology Dr Hutton  8.5.24: surveillance MRI : increased size left gluteal mass - will dw Dr Phillips   a1c 5.9%, LDL 46, TSH 0.71  needs flonase refill for allergic rhinitis

## 2024-08-13 ENCOUNTER — APPOINTMENT (OUTPATIENT)
Dept: SURGICAL ONCOLOGY | Facility: CLINIC | Age: 79
End: 2024-08-13
Payer: MEDICARE

## 2024-08-13 VITALS
HEART RATE: 82 BPM | BODY MASS INDEX: 45.5 KG/M2 | WEIGHT: 241 LBS | OXYGEN SATURATION: 96 % | HEIGHT: 61 IN | SYSTOLIC BLOOD PRESSURE: 166 MMHG | DIASTOLIC BLOOD PRESSURE: 81 MMHG

## 2024-08-13 DIAGNOSIS — M79.89 OTHER SPECIFIED SOFT TISSUE DISORDERS: ICD-10-CM

## 2024-08-13 PROCEDURE — 99214 OFFICE O/P EST MOD 30 MIN: CPT

## 2024-08-13 NOTE — HISTORY OF PRESENT ILLNESS
[de-identified] : Ms. EMMANUEL VEGA is an 78-year-old woman returning for ongoing follow-up for an adrenal adenoma & left gluteal mass.  She had initially presented in April 2019 for an abdominal MRI for surveillance of an adrenal adenoma. Imaging showed an incidental 4.5 cm mass in the left gluteal region. Patient is s/p CT guided percutaneous needle biopsy of left gluteal mass on 05/06/2019 by IR. 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   We discussed surgical excision, however, she opted for ongoing MRI surveillance.   MRI in January 2020 re demonstrated the mass, which is decreased in size from prior exam, currently measuring 4.1 cm.   She underwent US guided FNA of a right thyroid lobe nodule 1/21/20 with Dr. Shiva Montoya.   Surveillance MRI in July 2021 demonstrated enhancing soft tissue tumor in the left gluteus medius muscle. No interval change in size or appearance compared to the previous MRI.  Surveillance MRI from 7/2022, no interval changes to size or appearance.  MRI lower left leg 7/24/23:  Heterogeneous enhancing mass of the left gluteus medius is again seen measuring approximately 3.8 x 2.7 x 4.0 cm that is relatively stable to mildly decreased in size over time  MRI abdomen 6/21/23: Stable 1.3 cm left adrenal adenoma   Her past medical history includes HTN, DM 2, hypothyroidism, PE in 2014 (felt secondary to prolonged medroxyprogesterone use for dysfunctional uterine bleeding) she was on Coumadin for 3 years but was advised to stop per her hematologist.  She is feeling well and denies any symptoms related to the mass.    MR LLE 7/2024 - interval increase in size of the heterogenous appearing enhancing mass, 3.6 x 3.6 cm (previously 2.7 x 3.8 cm in 2023)  On close review of the MRI's there is minimal if any increase in size of the mass No symptoms

## 2024-08-13 NOTE — ASSESSMENT
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

## 2024-08-13 NOTE — ASSESSMENT
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

## 2024-08-13 NOTE — PHYSICAL EXAM
[Normal] : supple, no neck mass and thyroid not enlarged [Normal Supraclavicular Lymph Nodes] : normal supraclavicular lymph nodes [Normal Groin Lymph Nodes] : normal groin lymph nodes [Normal] : oriented to person, place and time, with appropriate affect [de-identified] : no palpable mass ion the left buttock

## 2024-08-13 NOTE — HISTORY OF PRESENT ILLNESS
[de-identified] : Ms. EMMANUEL VEGA is an 78-year-old woman returning for ongoing follow-up for an adrenal adenoma & left gluteal mass.  She had initially presented in April 2019 for an abdominal MRI for surveillance of an adrenal adenoma. Imaging showed an incidental 4.5 cm mass in the left gluteal region. Patient is s/p CT guided percutaneous needle biopsy of left gluteal mass on 05/06/2019 by IR. 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   We discussed surgical excision, however, she opted for ongoing MRI surveillance.   MRI in January 2020 re demonstrated the mass, which is decreased in size from prior exam, currently measuring 4.1 cm.   She underwent US guided FNA of a right thyroid lobe nodule 1/21/20 with Dr. Shiva Montoya.   Surveillance MRI in July 2021 demonstrated enhancing soft tissue tumor in the left gluteus medius muscle. No interval change in size or appearance compared to the previous MRI.  Surveillance MRI from 7/2022, no interval changes to size or appearance.  MRI lower left leg 7/24/23:  Heterogeneous enhancing mass of the left gluteus medius is again seen measuring approximately 3.8 x 2.7 x 4.0 cm that is relatively stable to mildly decreased in size over time  MRI abdomen 6/21/23: Stable 1.3 cm left adrenal adenoma   Her past medical history includes HTN, DM 2, hypothyroidism, PE in 2014 (felt secondary to prolonged medroxyprogesterone use for dysfunctional uterine bleeding) she was on Coumadin for 3 years but was advised to stop per her hematologist.  She is feeling well and denies any symptoms related to the mass.    MR LLE 7/2024 - interval increase in size of the heterogenous appearing enhancing mass, 3.6 x 3.6 cm (previously 2.7 x 3.8 cm in 2023)  On close review of the MRI's there is minimal if any increase in size of the mass No symptoms

## 2024-09-13 ENCOUNTER — APPOINTMENT (OUTPATIENT)
Dept: INTERNAL MEDICINE | Facility: CLINIC | Age: 79
End: 2024-09-13
Payer: MEDICARE

## 2024-09-13 VITALS
OXYGEN SATURATION: 98 % | HEIGHT: 61 IN | WEIGHT: 245 LBS | HEART RATE: 71 BPM | BODY MASS INDEX: 46.26 KG/M2 | RESPIRATION RATE: 16 BRPM | TEMPERATURE: 97.5 F

## 2024-09-13 DIAGNOSIS — I10 ESSENTIAL (PRIMARY) HYPERTENSION: ICD-10-CM

## 2024-09-13 PROCEDURE — 99213 OFFICE O/P EST LOW 20 MIN: CPT

## 2024-09-13 PROCEDURE — G2211 COMPLEX E/M VISIT ADD ON: CPT

## 2024-09-13 RX ORDER — CARVEDILOL 3.12 MG/1
3.12 TABLET, FILM COATED ORAL AT BEDTIME
Refills: 0 | Status: DISCONTINUED | COMMUNITY
Start: 2024-09-13 | End: 2024-09-13

## 2024-09-15 VITALS — DIASTOLIC BLOOD PRESSURE: 70 MMHG | SYSTOLIC BLOOD PRESSURE: 150 MMHG

## 2024-09-15 NOTE — PLAN
[FreeTextEntry1] : RTC 2-3 mos for BP check   During today's visit, I spent 20 minutes reviewing the patient's medical chart, addressing the patient's concerns, and discussing their treatment plan in detail. We reviewed the results of recent tests and discussed the next steps in their care, including medication adjustments and follow-up appointments. Additionally, I took the time to answer the patient's questions and provide education on managing their condition at home.  This extended consultation allowed for a comprehensive assessment and personalized approach to the patient, reflecting the complexity and time required for today's visit.

## 2024-09-15 NOTE — HISTORY OF PRESENT ILLNESS
[de-identified] : 78yoF PMH DM2, HTN, hypothyroidism, adrenal adenoma (stable), left gluteal lipoma w spindle cell presents for routine follow up  swelling minimal now after amlodipine decreased unable to discontinue nsaid and gabapentin  endorses fatigue with exertion has not seen cardiology for years denies chest pain, sob  22:  2-3 weeks ago, seen in ED for elevated blood pressure after   unexpectedly  endorses sore throat after  denies fever, chills, cough COVID negative  10/21: started carvedilol - no adverse effects BP unchanged  foot edema resolved upcoming appt with cardiology  : vascular - advised compression stockings feet edema nearly resolved  cardiology started patient on hydralazine repeat /80  seen by endo - thyroid US and DEXA Rx provided  : ankle edema resolved  required prednisone for viral URI  2.5.24: doing well pedal edema resolved  s/p Moh's face for BCC - dermatology Dr Hutton  8..24: surveillance MRI : increased size left gluteal mass - will dw Dr Phillips   a1c 5.9%, LDL 46, TSH 0.71  needs flonase refill for allergic rhinitis  9..24: concerned about elevated BP at home 150s-160s son skmarichuy and some concern about this endorses mild dizziness w elevated BP denies ha, vision change, cp, palpitations, sob manual BP in office 150/70    [FreeTextEntry1] : BP check

## 2024-09-16 ENCOUNTER — TRANSCRIPTION ENCOUNTER (OUTPATIENT)
Age: 79
End: 2024-09-16

## 2024-09-16 RX ORDER — GABAPENTIN 300 MG/1
300 CAPSULE ORAL
Qty: 90 | Refills: 1 | Status: ACTIVE | COMMUNITY
Start: 2024-09-16 | End: 1900-01-01

## 2024-09-30 ENCOUNTER — EMERGENCY (EMERGENCY)
Facility: HOSPITAL | Age: 79
LOS: 1 days | Discharge: HOME | End: 2024-09-30
Attending: EMERGENCY MEDICINE
Payer: MEDICARE

## 2024-09-30 ENCOUNTER — TRANSCRIPTION ENCOUNTER (OUTPATIENT)
Age: 79
End: 2024-09-30

## 2024-09-30 VITALS
RESPIRATION RATE: 18 BRPM | TEMPERATURE: 99 F | DIASTOLIC BLOOD PRESSURE: 87 MMHG | SYSTOLIC BLOOD PRESSURE: 164 MMHG | WEIGHT: 250 LBS | OXYGEN SATURATION: 99 % | HEIGHT: 60 IN | HEART RATE: 89 BPM

## 2024-09-30 VITALS
OXYGEN SATURATION: 96 % | TEMPERATURE: 99 F | SYSTOLIC BLOOD PRESSURE: 109 MMHG | HEART RATE: 69 BPM | RESPIRATION RATE: 17 BRPM | DIASTOLIC BLOOD PRESSURE: 72 MMHG

## 2024-09-30 DIAGNOSIS — Z98.890 OTHER SPECIFIED POSTPROCEDURAL STATES: Chronic | ICD-10-CM

## 2024-09-30 DIAGNOSIS — H26.9 UNSPECIFIED CATARACT: Chronic | ICD-10-CM

## 2024-09-30 DIAGNOSIS — Z90.89 ACQUIRED ABSENCE OF OTHER ORGANS: Chronic | ICD-10-CM

## 2024-09-30 LAB
ALBUMIN SERPL ELPH-MCNC: 3.8 G/DL — SIGNIFICANT CHANGE UP (ref 3.5–5)
ALP SERPL-CCNC: 55 U/L — SIGNIFICANT CHANGE UP (ref 40–120)
ALT FLD-CCNC: 27 U/L DA — SIGNIFICANT CHANGE UP (ref 10–60)
ANION GAP SERPL CALC-SCNC: 3 MMOL/L — LOW (ref 5–17)
AST SERPL-CCNC: 21 U/L — SIGNIFICANT CHANGE UP (ref 10–40)
BASOPHILS # BLD AUTO: 0.02 K/UL — SIGNIFICANT CHANGE UP (ref 0–0.2)
BASOPHILS NFR BLD AUTO: 0.2 % — SIGNIFICANT CHANGE UP (ref 0–2)
BILIRUB SERPL-MCNC: 0.8 MG/DL — SIGNIFICANT CHANGE UP (ref 0.2–1.2)
BUN SERPL-MCNC: 19 MG/DL — HIGH (ref 7–18)
CALCIUM SERPL-MCNC: 9.6 MG/DL — SIGNIFICANT CHANGE UP (ref 8.4–10.5)
CHLORIDE SERPL-SCNC: 107 MMOL/L — SIGNIFICANT CHANGE UP (ref 96–108)
CO2 SERPL-SCNC: 30 MMOL/L — SIGNIFICANT CHANGE UP (ref 22–31)
CREAT SERPL-MCNC: 0.85 MG/DL — SIGNIFICANT CHANGE UP (ref 0.5–1.3)
EGFR: 69 ML/MIN/1.73M2 — SIGNIFICANT CHANGE UP
EOSINOPHIL # BLD AUTO: 0.17 K/UL — SIGNIFICANT CHANGE UP (ref 0–0.5)
EOSINOPHIL NFR BLD AUTO: 1.9 % — SIGNIFICANT CHANGE UP (ref 0–6)
FLUAV AG NPH QL: SIGNIFICANT CHANGE UP
FLUBV AG NPH QL: SIGNIFICANT CHANGE UP
GLUCOSE SERPL-MCNC: 135 MG/DL — HIGH (ref 70–99)
HCT VFR BLD CALC: 39.6 % — SIGNIFICANT CHANGE UP (ref 34.5–45)
HGB BLD-MCNC: 13.5 G/DL — SIGNIFICANT CHANGE UP (ref 11.5–15.5)
IMM GRANULOCYTES NFR BLD AUTO: 0.3 % — SIGNIFICANT CHANGE UP (ref 0–0.9)
LYMPHOCYTES # BLD AUTO: 0.94 K/UL — LOW (ref 1–3.3)
LYMPHOCYTES # BLD AUTO: 10.7 % — LOW (ref 13–44)
MAGNESIUM SERPL-MCNC: 1.7 MG/DL — SIGNIFICANT CHANGE UP (ref 1.6–2.6)
MCHC RBC-ENTMCNC: 30.8 PG — SIGNIFICANT CHANGE UP (ref 27–34)
MCHC RBC-ENTMCNC: 34.1 GM/DL — SIGNIFICANT CHANGE UP (ref 32–36)
MCV RBC AUTO: 90.2 FL — SIGNIFICANT CHANGE UP (ref 80–100)
MONOCYTES # BLD AUTO: 0.71 K/UL — SIGNIFICANT CHANGE UP (ref 0–0.9)
MONOCYTES NFR BLD AUTO: 8.1 % — SIGNIFICANT CHANGE UP (ref 2–14)
NEUTROPHILS # BLD AUTO: 6.94 K/UL — SIGNIFICANT CHANGE UP (ref 1.8–7.4)
NEUTROPHILS NFR BLD AUTO: 78.8 % — HIGH (ref 43–77)
NRBC # BLD: 0 /100 WBCS — SIGNIFICANT CHANGE UP (ref 0–0)
PLATELET # BLD AUTO: 166 K/UL — SIGNIFICANT CHANGE UP (ref 150–400)
POTASSIUM SERPL-MCNC: 4 MMOL/L — SIGNIFICANT CHANGE UP (ref 3.5–5.3)
POTASSIUM SERPL-SCNC: 4 MMOL/L — SIGNIFICANT CHANGE UP (ref 3.5–5.3)
PROT SERPL-MCNC: 7.1 G/DL — SIGNIFICANT CHANGE UP (ref 6–8.3)
RBC # BLD: 4.39 M/UL — SIGNIFICANT CHANGE UP (ref 3.8–5.2)
RBC # FLD: 12.7 % — SIGNIFICANT CHANGE UP (ref 10.3–14.5)
SARS-COV-2 RNA SPEC QL NAA+PROBE: SIGNIFICANT CHANGE UP
SODIUM SERPL-SCNC: 140 MMOL/L — SIGNIFICANT CHANGE UP (ref 135–145)
TROPONIN I, HIGH SENSITIVITY RESULT: 5.6 NG/L — SIGNIFICANT CHANGE UP
WBC # BLD: 8.81 K/UL — SIGNIFICANT CHANGE UP (ref 3.8–10.5)
WBC # FLD AUTO: 8.81 K/UL — SIGNIFICANT CHANGE UP (ref 3.8–10.5)

## 2024-09-30 PROCEDURE — 99284 EMERGENCY DEPT VISIT MOD MDM: CPT | Mod: 25

## 2024-09-30 PROCEDURE — 84484 ASSAY OF TROPONIN QUANT: CPT

## 2024-09-30 PROCEDURE — 83735 ASSAY OF MAGNESIUM: CPT

## 2024-09-30 PROCEDURE — 36415 COLL VENOUS BLD VENIPUNCTURE: CPT

## 2024-09-30 PROCEDURE — 85025 COMPLETE CBC W/AUTO DIFF WBC: CPT

## 2024-09-30 PROCEDURE — 80053 COMPREHEN METABOLIC PANEL: CPT

## 2024-09-30 PROCEDURE — 87636 SARSCOV2 & INF A&B AMP PRB: CPT

## 2024-09-30 PROCEDURE — 71046 X-RAY EXAM CHEST 2 VIEWS: CPT

## 2024-09-30 PROCEDURE — 96374 THER/PROPH/DIAG INJ IV PUSH: CPT

## 2024-09-30 PROCEDURE — 71046 X-RAY EXAM CHEST 2 VIEWS: CPT | Mod: 26

## 2024-09-30 PROCEDURE — 94640 AIRWAY INHALATION TREATMENT: CPT

## 2024-09-30 PROCEDURE — 99285 EMERGENCY DEPT VISIT HI MDM: CPT

## 2024-09-30 RX ORDER — ACETAMINOPHEN 325 MG/1
1000 TABLET ORAL ONCE
Refills: 0 | Status: COMPLETED | OUTPATIENT
Start: 2024-09-30 | End: 2024-09-30

## 2024-09-30 RX ORDER — IPRATROPIUM BROMIDE AND ALBUTEROL SULFATE .5; 3 MG/3ML; MG/3ML
3 SOLUTION RESPIRATORY (INHALATION) ONCE
Refills: 0 | Status: COMPLETED | OUTPATIENT
Start: 2024-09-30 | End: 2024-09-30

## 2024-09-30 RX ORDER — AZITHROMYCIN 500 MG/1
1 TABLET, FILM COATED ORAL
Qty: 6 | Refills: 0
Start: 2024-09-30 | End: 2024-10-04

## 2024-09-30 RX ADMIN — ACETAMINOPHEN 1000 MILLIGRAM(S): 325 TABLET ORAL at 06:33

## 2024-09-30 RX ADMIN — IPRATROPIUM BROMIDE AND ALBUTEROL SULFATE 3 MILLILITER(S): .5; 3 SOLUTION RESPIRATORY (INHALATION) at 06:18

## 2024-09-30 RX ADMIN — ACETAMINOPHEN 400 MILLIGRAM(S): 325 TABLET ORAL at 06:18

## 2024-09-30 NOTE — ED PROVIDER NOTE - PATIENT PORTAL LINK FT
You can access the FollowMyHealth Patient Portal offered by [facility] by registering at the following website: [email or web address]. By joining [facility]’s FollowMyHealth portal, you will also be able to view your health information using other applications (apps) compatible with our system.

## 2024-09-30 NOTE — ED ADULT NURSE NOTE - ALCOHOL PRE SCREEN (AUDIT - C)
Statement Selected Since admission to the NBN, baby has been feeding well, stooling and making wet diapers. Vitals have remained stable. Baby received routine NBN care and passed CCHD and auditory screening.        Discharge Physical Exam  Gen: NAD; well-appearing  HEENT: NC/AT; AFOF; red reflex positive bilaterally; ears and nose clinically patent, normally set; no tags ; oropharynx clear  Skin: pink, warm, well-perfused, no rash  Resp: CTAB, even, non-labored breathing  Cardiac: RRR, normal S1 and S2; no murmurs; 2+ femoral pulses b/l  Abd: soft, NT/ND; +BS; no HSM  Extremities: FROM; no crepitus; Hips: negative O/B  : Kelby I; no abnormalities; no hernia; anus patent  Neuro: +wilfrid, suck, grasp, Babinski; good tone throughout      Stable for discharge to home after receiving routine  care education and instructions to follow up with pediatrician appointment in 1-2 days.

## 2024-09-30 NOTE — ED ADULT NURSE REASSESSMENT NOTE - NS ED NURSE REASSESS COMMENT FT1
Received patient from night RN awaiting dispo, patient notes she feel better after meds awaiting to go home.

## 2024-09-30 NOTE — ED PROVIDER NOTE - NSFOLLOWUPINSTRUCTIONS_ED_ALL_ED_FT
Acute Bronchitis    WHAT YOU NEED TO KNOW:    What is acute bronchitis? Acute bronchitis is swelling and irritation in your lungs. It is usually caused by a virus and most often happens in the winter. Bronchitis may also be caused by bacteria or by a chemical irritant, such as smoke.    What are the signs and symptoms of acute bronchitis?    Cough that lasts up to 3 weeks    Runny or stuffy nose    Hoarseness, sore throat    Fever    Feeling more tired than usual, and body aches    Wheezing or pain when you breathe or cough  How is acute bronchitis treated? You may need any of the following:    Cough suppressants decrease your urge to cough.    Decongestants help loosen mucus in your lungs and make it easier to cough up. This can help you breathe easier.    Inhalers may be given. Your healthcare provider may give you one or more inhalers to help you breathe easier and cough less. An inhaler gives you medicine to open your airways. Ask your healthcare provider to show you how to use your inhaler correctly.  Metered Dose Inhaler      Antiviral medicine treats infections caused by a virus.    Antibiotics may be given if your bronchitis is caused by bacteria or if you have lung condition.    Acetaminophen decreases pain and fever. It is available without a doctor's order. Ask how much to take and how often to take it. Follow directions. Read the labels of all other medicines you are using to see if they also contain acetaminophen, or ask your doctor or pharmacist. Acetaminophen can cause liver damage if not taken correctly.    NSAIDs help decrease swelling and pain or fever. This medicine is available with or without a doctor's order. NSAIDs can cause stomach bleeding or kidney problems in certain people. If you take blood thinner medicine, always ask your healthcare provider if NSAIDs are safe for you. Always read the medicine label and follow directions.  How can I manage my symptoms?    Drink liquids as directed. You may need to drink more liquids than usual to stay hydrated. Ask how much liquid to drink each day and which liquids are best for you.    Use a cool mist humidifier. This increases air moisture in your home. This may make it easier for you to breathe and help decrease your cough.  Humidifier      Get more rest. Rest helps your body to heal. Slowly start to do more each day. Rest when you feel it is needed.  How can I help prevent acute bronchitis?      Ask about vaccines you may need. Get a flu vaccine each year as soon as recommended, usually in September or October. Ask your healthcare provider if you should also get a pneumonia or COVID-19 vaccine. Your healthcare provider can tell you if you should also get other vaccines, and when to get them.    Prevent the spread of germs.  Wash your hands often with soap and water. Carry germ-killing hand lotion or gel with you. You can use the lotion or gel to clean your hands when soap and water are not available.  Handwashing      Do not touch your eyes, nose, or mouth unless you have washed your hands first.    Always cover your mouth when you cough to prevent the spread of germs. It is best to cough into a tissue or your shirt sleeve instead of into your hand. Ask those around you to cover their mouths when they cough.    Try to avoid people who have a cold or the flu. If you are sick, stay away from others as much as possible.    Avoid irritants in the air. Avoid chemicals, fumes, and dust. Wear a face mask if you must work around dust or fumes. Stay inside on days when air pollution levels are high. If you have allergies, stay inside when pollen counts are high. Do not use aerosol products, such as spray-on deodorant, bug spray, and hair spray.    Do not smoke or be around others who are smoking. Nicotine and other chemicals in cigarettes and cigars can cause lung damage. Ask your healthcare provider for information if you currently smoke and need help to quit. E-cigarettes or smokeless tobacco still contain nicotine. Talk to your healthcare provider before you use these products.  When should I seek immediate care?    You cough up blood.    Your lips or fingernails turn blue.    You feel like you are not getting enough air when you breathe.  When should I call my doctor?    Your symptoms do not go away or get worse, even after treatment.    Your cough does not get better within 4 weeks.    You have questions or concerns about your condition or care.  CARE AGREEMENT:    You have the right to help plan your care. Learn about your health condition and how it may be treated. Discuss treatment options with your healthcare providers to decide what care you want to receive. You always have the right to refuse treatment.    © [employer] 1973, 2024

## 2024-09-30 NOTE — ED PROVIDER NOTE - CHILD ABUSE FACILITY
[facility] ICU Progress Note      Admit Date: 9/7/2021  ICU Day: 1  [] 1      Resident(s): Beth Oconnor M.D.  Attending: JOANNA JACKSON/ Dr. Seals    Date & Time:   9/8/[place] OF ARENAS WITHIN NORMAL LIMITS.      MR-BRAIN-WITH & TBD    Beth Oconnor M.D.     H

## 2024-09-30 NOTE — ED PROVIDER NOTE - CLINICAL SUMMARY MEDICAL DECISION MAKING FREE TEXT BOX
78-year-old female presents with cough and flulike symptoms for the last 24 hours.  Will check checks x-ray rule out pneumonia, viral swab, supportive care, reassess

## 2024-09-30 NOTE — ED ADULT NURSE NOTE - OBJECTIVE STATEMENT
Patient BIB EMS from home c/o dry cough, throat irritation and chills x yesterday morning. Endorses SOB, SPO2 98% on room air. Denies chest pain, dizziness, headache, nausea and vomiting. Denies fevers.

## 2024-09-30 NOTE — ED ADULT NURSE NOTE - NS ED NURSE DISCH DISPOSITION
Radiology Department will contact you  to schedule your mri /emgppointment.    You must make a follow up appointment with Dr. Milner 3-5 days after the mri/emg scan to go over the results..       If you are not contacted within 72  hours please contact Central Scheduling @ [phone] to inquire about scheduling delays.   Discharged

## 2024-09-30 NOTE — ED PROVIDER NOTE - OBJECTIVE STATEMENT
78-year-old female history of diabetes, hypertension, hyperlipidemia, hypothyroid and history of PE presents to ED with cough for the past 24 hours.  Symptoms associated with myalgias, chills, and sore throat.  As per patient she took an at-home COVID test and it was found to be negative.  No fever, no chest pain, no shortness of breath, no nausea, no vomiting, no urinary complaints

## 2024-10-04 ENCOUNTER — NON-APPOINTMENT (OUTPATIENT)
Age: 79
End: 2024-10-04

## 2024-10-04 ENCOUNTER — APPOINTMENT (OUTPATIENT)
Dept: INTERNAL MEDICINE | Facility: CLINIC | Age: 79
End: 2024-10-04

## 2024-10-05 ENCOUNTER — TRANSCRIPTION ENCOUNTER (OUTPATIENT)
Age: 79
End: 2024-10-05

## 2024-10-15 ENCOUNTER — APPOINTMENT (OUTPATIENT)
Dept: ENDOCRINOLOGY | Facility: CLINIC | Age: 79
End: 2024-10-15
Payer: MEDICARE

## 2024-10-15 VITALS
OXYGEN SATURATION: 97 % | BODY MASS INDEX: 45.88 KG/M2 | HEIGHT: 61 IN | TEMPERATURE: 97.3 F | SYSTOLIC BLOOD PRESSURE: 150 MMHG | DIASTOLIC BLOOD PRESSURE: 82 MMHG | HEART RATE: 67 BPM | RESPIRATION RATE: 16 BRPM | WEIGHT: 243 LBS

## 2024-10-15 DIAGNOSIS — E66.01 MORBID (SEVERE) OBESITY DUE TO EXCESS CALORIES: ICD-10-CM

## 2024-10-15 DIAGNOSIS — E03.9 HYPOTHYROIDISM, UNSPECIFIED: ICD-10-CM

## 2024-10-15 DIAGNOSIS — E11.9 TYPE 2 DIABETES MELLITUS W/OUT COMPLICATIONS: ICD-10-CM

## 2024-10-15 PROCEDURE — 82962 GLUCOSE BLOOD TEST: CPT

## 2024-10-15 PROCEDURE — 99214 OFFICE O/P EST MOD 30 MIN: CPT

## 2024-10-15 PROCEDURE — G2211 COMPLEX E/M VISIT ADD ON: CPT

## 2024-10-16 LAB — GLUCOSE BLDC GLUCOMTR-MCNC: 107

## 2024-12-14 ENCOUNTER — TRANSCRIPTION ENCOUNTER (OUTPATIENT)
Age: 79
End: 2024-12-14

## 2024-12-16 ENCOUNTER — RX RENEWAL (OUTPATIENT)
Age: 79
End: 2024-12-16

## 2024-12-16 ENCOUNTER — TRANSCRIPTION ENCOUNTER (OUTPATIENT)
Age: 79
End: 2024-12-16

## 2025-01-21 ENCOUNTER — APPOINTMENT (OUTPATIENT)
Dept: CARDIOLOGY | Facility: CLINIC | Age: 80
End: 2025-01-21

## 2025-02-10 ENCOUNTER — APPOINTMENT (OUTPATIENT)
Dept: ENDOCRINOLOGY | Facility: CLINIC | Age: 80
End: 2025-02-10
Payer: MEDICARE

## 2025-02-10 ENCOUNTER — APPOINTMENT (OUTPATIENT)
Dept: INTERNAL MEDICINE | Facility: CLINIC | Age: 80
End: 2025-02-10
Payer: MEDICARE

## 2025-02-10 VITALS
TEMPERATURE: 98.3 F | OXYGEN SATURATION: 96 % | WEIGHT: 243 LBS | RESPIRATION RATE: 16 BRPM | DIASTOLIC BLOOD PRESSURE: 75 MMHG | HEART RATE: 65 BPM | HEIGHT: 61 IN | SYSTOLIC BLOOD PRESSURE: 171 MMHG | BODY MASS INDEX: 45.88 KG/M2

## 2025-02-10 VITALS
OXYGEN SATURATION: 96 % | DIASTOLIC BLOOD PRESSURE: 70 MMHG | BODY MASS INDEX: 45.88 KG/M2 | HEART RATE: 65 BPM | RESPIRATION RATE: 16 BRPM | HEIGHT: 61 IN | SYSTOLIC BLOOD PRESSURE: 130 MMHG | WEIGHT: 243 LBS | TEMPERATURE: 98.3 F

## 2025-02-10 DIAGNOSIS — Z13.820 ENCOUNTER FOR SCREENING FOR OSTEOPOROSIS: ICD-10-CM

## 2025-02-10 DIAGNOSIS — E04.1 NONTOXIC SINGLE THYROID NODULE: ICD-10-CM

## 2025-02-10 DIAGNOSIS — D35.00 BENIGN NEOPLASM OF UNSPECIFIED ADRENAL GLAND: ICD-10-CM

## 2025-02-10 DIAGNOSIS — E03.9 HYPOTHYROIDISM, UNSPECIFIED: ICD-10-CM

## 2025-02-10 LAB — GLUCOSE BLDC GLUCOMTR-MCNC: 93

## 2025-02-10 PROCEDURE — 82962 GLUCOSE BLOOD TEST: CPT

## 2025-02-10 PROCEDURE — G0009: CPT

## 2025-02-10 PROCEDURE — 90677 PCV20 VACCINE IM: CPT

## 2025-02-10 PROCEDURE — G2211 COMPLEX E/M VISIT ADD ON: CPT

## 2025-02-10 PROCEDURE — 99214 OFFICE O/P EST MOD 30 MIN: CPT

## 2025-02-10 PROCEDURE — G0439: CPT

## 2025-02-11 ENCOUNTER — APPOINTMENT (OUTPATIENT)
Dept: CARDIOLOGY | Facility: CLINIC | Age: 80
End: 2025-02-11
Payer: MEDICARE

## 2025-02-11 ENCOUNTER — NON-APPOINTMENT (OUTPATIENT)
Age: 80
End: 2025-02-11

## 2025-02-11 VITALS
OXYGEN SATURATION: 97 % | TEMPERATURE: 97.3 F | DIASTOLIC BLOOD PRESSURE: 90 MMHG | BODY MASS INDEX: 45.91 KG/M2 | SYSTOLIC BLOOD PRESSURE: 146 MMHG | HEART RATE: 72 BPM | WEIGHT: 243 LBS

## 2025-02-11 DIAGNOSIS — E11.9 TYPE 2 DIABETES MELLITUS W/OUT COMPLICATIONS: ICD-10-CM

## 2025-02-11 DIAGNOSIS — E78.5 HYPERLIPIDEMIA, UNSPECIFIED: ICD-10-CM

## 2025-02-11 DIAGNOSIS — I10 ESSENTIAL (PRIMARY) HYPERTENSION: ICD-10-CM

## 2025-02-11 PROCEDURE — 93000 ELECTROCARDIOGRAM COMPLETE: CPT

## 2025-02-11 PROCEDURE — 99214 OFFICE O/P EST MOD 30 MIN: CPT

## 2025-02-11 PROCEDURE — G2211 COMPLEX E/M VISIT ADD ON: CPT

## 2025-03-14 ENCOUNTER — RX RENEWAL (OUTPATIENT)
Age: 80
End: 2025-03-14

## 2025-03-14 ENCOUNTER — TRANSCRIPTION ENCOUNTER (OUTPATIENT)
Age: 80
End: 2025-03-14

## 2025-03-31 ENCOUNTER — NON-APPOINTMENT (OUTPATIENT)
Age: 80
End: 2025-03-31

## 2025-03-31 ENCOUNTER — APPOINTMENT (OUTPATIENT)
Dept: INTERNAL MEDICINE | Facility: CLINIC | Age: 80
End: 2025-03-31
Payer: MEDICARE

## 2025-03-31 VITALS
DIASTOLIC BLOOD PRESSURE: 78 MMHG | SYSTOLIC BLOOD PRESSURE: 150 MMHG | BODY MASS INDEX: 45.69 KG/M2 | HEIGHT: 61 IN | WEIGHT: 242 LBS | HEART RATE: 76 BPM | TEMPERATURE: 97.3 F | OXYGEN SATURATION: 96 % | RESPIRATION RATE: 16 BRPM

## 2025-03-31 VITALS — SYSTOLIC BLOOD PRESSURE: 150 MMHG | DIASTOLIC BLOOD PRESSURE: 70 MMHG

## 2025-03-31 DIAGNOSIS — Z91.81 HISTORY OF FALLING: ICD-10-CM

## 2025-03-31 PROCEDURE — 99213 OFFICE O/P EST LOW 20 MIN: CPT

## 2025-04-01 PROBLEM — Z91.81 STATUS POST FALL: Status: ACTIVE | Noted: 2025-03-31

## 2025-05-09 ENCOUNTER — TRANSCRIPTION ENCOUNTER (OUTPATIENT)
Age: 80
End: 2025-05-09

## 2025-05-16 ENCOUNTER — TRANSCRIPTION ENCOUNTER (OUTPATIENT)
Age: 80
End: 2025-05-16

## 2025-06-02 ENCOUNTER — TRANSCRIPTION ENCOUNTER (OUTPATIENT)
Age: 80
End: 2025-06-02

## 2025-06-04 ENCOUNTER — OUTPATIENT (OUTPATIENT)
Dept: OUTPATIENT SERVICES | Facility: HOSPITAL | Age: 80
LOS: 1 days | End: 2025-06-04
Payer: MEDICARE

## 2025-06-04 ENCOUNTER — APPOINTMENT (OUTPATIENT)
Dept: ULTRASOUND IMAGING | Facility: HOSPITAL | Age: 80
End: 2025-06-04

## 2025-06-04 DIAGNOSIS — Z90.89 ACQUIRED ABSENCE OF OTHER ORGANS: Chronic | ICD-10-CM

## 2025-06-04 DIAGNOSIS — E04.1 NONTOXIC SINGLE THYROID NODULE: ICD-10-CM

## 2025-06-04 DIAGNOSIS — Z98.890 OTHER SPECIFIED POSTPROCEDURAL STATES: Chronic | ICD-10-CM

## 2025-06-04 DIAGNOSIS — H26.9 UNSPECIFIED CATARACT: Chronic | ICD-10-CM

## 2025-06-04 PROCEDURE — 76536 US EXAM OF HEAD AND NECK: CPT

## 2025-06-04 PROCEDURE — 76536 US EXAM OF HEAD AND NECK: CPT | Mod: 26

## 2025-06-23 ENCOUNTER — APPOINTMENT (OUTPATIENT)
Dept: ENDOCRINOLOGY | Facility: CLINIC | Age: 80
End: 2025-06-23
Payer: MEDICARE

## 2025-06-23 VITALS
BODY MASS INDEX: 45.5 KG/M2 | WEIGHT: 241 LBS | TEMPERATURE: 96 F | SYSTOLIC BLOOD PRESSURE: 138 MMHG | DIASTOLIC BLOOD PRESSURE: 82 MMHG | HEART RATE: 84 BPM | OXYGEN SATURATION: 97 % | RESPIRATION RATE: 16 BRPM | HEIGHT: 61 IN

## 2025-06-23 PROCEDURE — 82962 GLUCOSE BLOOD TEST: CPT

## 2025-06-23 PROCEDURE — 99214 OFFICE O/P EST MOD 30 MIN: CPT

## 2025-06-23 PROCEDURE — 83036 HEMOGLOBIN GLYCOSYLATED A1C: CPT | Mod: QW

## 2025-06-23 PROCEDURE — G2211 COMPLEX E/M VISIT ADD ON: CPT

## 2025-06-24 LAB — GLUCOSE BLDC GLUCOMTR-MCNC: 113

## 2025-06-26 ENCOUNTER — TRANSCRIPTION ENCOUNTER (OUTPATIENT)
Age: 80
End: 2025-06-26

## 2025-08-11 ENCOUNTER — APPOINTMENT (OUTPATIENT)
Dept: MRI IMAGING | Facility: CLINIC | Age: 80
End: 2025-08-11
Payer: MEDICARE

## 2025-08-11 ENCOUNTER — EMERGENCY (EMERGENCY)
Facility: HOSPITAL | Age: 80
LOS: 1 days | End: 2025-08-11
Admitting: STUDENT IN AN ORGANIZED HEALTH CARE EDUCATION/TRAINING PROGRAM
Payer: MEDICARE

## 2025-08-11 ENCOUNTER — APPOINTMENT (OUTPATIENT)
Dept: CARDIOLOGY | Facility: CLINIC | Age: 80
End: 2025-08-11

## 2025-08-11 DIAGNOSIS — Z90.89 ACQUIRED ABSENCE OF OTHER ORGANS: Chronic | ICD-10-CM

## 2025-08-11 DIAGNOSIS — H26.9 UNSPECIFIED CATARACT: Chronic | ICD-10-CM

## 2025-08-11 DIAGNOSIS — Z98.890 OTHER SPECIFIED POSTPROCEDURAL STATES: Chronic | ICD-10-CM

## 2025-08-11 PROCEDURE — 73720 MRI LWR EXTREMITY W/O&W/DYE: CPT | Mod: LT

## 2025-08-11 PROCEDURE — A9585: CPT | Mod: JZ

## 2025-08-11 PROCEDURE — 73562 X-RAY EXAM OF KNEE 3: CPT

## 2025-08-11 PROCEDURE — 99285 EMERGENCY DEPT VISIT HI MDM: CPT

## 2025-08-11 PROCEDURE — 70486 CT MAXILLOFACIAL W/O DYE: CPT

## 2025-08-11 PROCEDURE — 99284 EMERGENCY DEPT VISIT MOD MDM: CPT | Mod: 25

## 2025-08-11 PROCEDURE — 70450 CT HEAD/BRAIN W/O DYE: CPT

## 2025-08-18 ENCOUNTER — TRANSCRIPTION ENCOUNTER (OUTPATIENT)
Age: 80
End: 2025-08-18

## 2025-08-19 ENCOUNTER — APPOINTMENT (OUTPATIENT)
Dept: CARDIOLOGY | Facility: CLINIC | Age: 80
End: 2025-08-19
Payer: MEDICARE

## 2025-08-19 ENCOUNTER — APPOINTMENT (OUTPATIENT)
Dept: SURGICAL ONCOLOGY | Facility: CLINIC | Age: 80
End: 2025-08-19
Payer: MEDICARE

## 2025-08-19 VITALS
SYSTOLIC BLOOD PRESSURE: 161 MMHG | OXYGEN SATURATION: 97 % | BODY MASS INDEX: 45.12 KG/M2 | HEART RATE: 68 BPM | WEIGHT: 239 LBS | HEIGHT: 61 IN | DIASTOLIC BLOOD PRESSURE: 82 MMHG

## 2025-08-19 VITALS
BODY MASS INDEX: 45.16 KG/M2 | SYSTOLIC BLOOD PRESSURE: 139 MMHG | DIASTOLIC BLOOD PRESSURE: 77 MMHG | OXYGEN SATURATION: 97 % | HEART RATE: 63 BPM | WEIGHT: 239 LBS | TEMPERATURE: 97.7 F

## 2025-08-19 DIAGNOSIS — E11.9 TYPE 2 DIABETES MELLITUS W/OUT COMPLICATIONS: ICD-10-CM

## 2025-08-19 DIAGNOSIS — R93.89 ABNORMAL FINDINGS ON DIAGNOSTIC IMAGING OF OTHER SPECIFIED BODY STRUCTURES: ICD-10-CM

## 2025-08-19 DIAGNOSIS — I10 ESSENTIAL (PRIMARY) HYPERTENSION: ICD-10-CM

## 2025-08-19 DIAGNOSIS — E78.5 HYPERLIPIDEMIA, UNSPECIFIED: ICD-10-CM

## 2025-08-19 PROCEDURE — 99214 OFFICE O/P EST MOD 30 MIN: CPT

## 2025-08-19 PROCEDURE — G2211 COMPLEX E/M VISIT ADD ON: CPT

## 2025-08-25 ENCOUNTER — APPOINTMENT (OUTPATIENT)
Dept: OTOLARYNGOLOGY | Facility: CLINIC | Age: 80
End: 2025-08-25
Payer: MEDICARE

## 2025-08-25 DIAGNOSIS — S09.92XA UNSPECIFIED INJURY OF NOSE, INITIAL ENCOUNTER: ICD-10-CM

## 2025-08-25 DIAGNOSIS — Z82.49 FAMILY HISTORY OF ISCHEMIC HEART DISEASE AND OTHER DISEASES OF THE CIRCULATORY SYSTEM: ICD-10-CM

## 2025-08-25 DIAGNOSIS — Z78.9 OTHER SPECIFIED HEALTH STATUS: ICD-10-CM

## 2025-08-25 PROCEDURE — 99203 OFFICE O/P NEW LOW 30 MIN: CPT

## 2025-09-10 ENCOUNTER — TRANSCRIPTION ENCOUNTER (OUTPATIENT)
Age: 80
End: 2025-09-10